# Patient Record
Sex: MALE | Race: WHITE | NOT HISPANIC OR LATINO | Employment: FULL TIME | URBAN - METROPOLITAN AREA
[De-identification: names, ages, dates, MRNs, and addresses within clinical notes are randomized per-mention and may not be internally consistent; named-entity substitution may affect disease eponyms.]

---

## 2018-05-10 ENCOUNTER — OFFICE VISIT CONVERTED (OUTPATIENT)
Dept: UROLOGY | Facility: CLINIC | Age: 38
End: 2018-05-10
Attending: UROLOGY

## 2018-05-17 ENCOUNTER — PROCEDURE VISIT CONVERTED (OUTPATIENT)
Dept: UROLOGY | Facility: CLINIC | Age: 38
End: 2018-05-17
Attending: UROLOGY

## 2018-07-30 ENCOUNTER — OFFICE VISIT CONVERTED (OUTPATIENT)
Dept: UROLOGY | Facility: CLINIC | Age: 38
End: 2018-07-30
Attending: UROLOGY

## 2020-06-08 ENCOUNTER — HOSPITAL ENCOUNTER (OUTPATIENT)
Dept: GENERAL RADIOLOGY | Facility: HOSPITAL | Age: 40
Discharge: HOME | End: 2020-06-08
Attending: NURSE PRACTITIONER

## 2020-10-23 ENCOUNTER — OFFICE VISIT CONVERTED (OUTPATIENT)
Dept: NEUROSURGERY | Facility: CLINIC | Age: 40
End: 2020-10-23
Attending: PHYSICIAN ASSISTANT

## 2020-11-16 ENCOUNTER — OUTSIDE FACILITY SERVICE (OUTPATIENT)
Dept: SLEEP MEDICINE | Facility: HOSPITAL | Age: 40
End: 2020-11-16

## 2020-11-16 ENCOUNTER — HOSPITAL ENCOUNTER (OUTPATIENT)
Dept: SLEEP MEDICINE | Facility: HOSPITAL | Age: 40
Discharge: HOME | End: 2020-11-16
Attending: INTERNAL MEDICINE

## 2020-11-16 PROCEDURE — 99244 OFF/OP CNSLTJ NEW/EST MOD 40: CPT | Performed by: INTERNAL MEDICINE

## 2020-12-01 ENCOUNTER — HOSPITAL ENCOUNTER (OUTPATIENT)
Dept: SLEEP MEDICINE | Facility: HOSPITAL | Age: 40
Discharge: HOME | End: 2020-12-01
Attending: INTERNAL MEDICINE

## 2020-12-02 ENCOUNTER — OUTSIDE FACILITY SERVICE (OUTPATIENT)
Dept: SLEEP MEDICINE | Facility: HOSPITAL | Age: 40
End: 2020-12-02

## 2020-12-02 PROCEDURE — 95806 SLEEP STUDY UNATT&RESP EFFT: CPT | Performed by: INTERNAL MEDICINE

## 2021-03-08 ENCOUNTER — OUTSIDE FACILITY SERVICE (OUTPATIENT)
Dept: SLEEP MEDICINE | Facility: HOSPITAL | Age: 41
End: 2021-03-08

## 2021-03-08 ENCOUNTER — HOSPITAL ENCOUNTER (OUTPATIENT)
Dept: SLEEP MEDICINE | Facility: HOSPITAL | Age: 41
Discharge: HOME | End: 2021-03-08
Attending: INTERNAL MEDICINE

## 2021-03-08 PROCEDURE — 99213 OFFICE O/P EST LOW 20 MIN: CPT | Performed by: INTERNAL MEDICINE

## 2021-04-06 ENCOUNTER — HOSPITAL ENCOUNTER (OUTPATIENT)
Age: 41
Discharge: HOME | End: 2021-04-06
Payer: COMMERCIAL

## 2021-04-06 ENCOUNTER — HOSPITAL ENCOUNTER (OUTPATIENT)
Dept: GENERAL RADIOLOGY | Age: 41
Discharge: HOME | End: 2021-04-06
Payer: COMMERCIAL

## 2021-04-06 DIAGNOSIS — R52 PAIN: ICD-10-CM

## 2021-04-06 PROCEDURE — 72070 X-RAY EXAM THORAC SPINE 2VWS: CPT

## 2021-04-06 PROCEDURE — 73560 X-RAY EXAM OF KNEE 1 OR 2: CPT

## 2021-04-06 PROCEDURE — 72100 X-RAY EXAM L-S SPINE 2/3 VWS: CPT

## 2021-05-10 NOTE — H&P
History and Physical      Patient Name: Aidan Curiel   Patient ID: [id number]   Sex: Male   YOB: 1980    Primary Care Provider: [facility]   Referring Provider: Sangita Anaya MD    Visit Date: October 23, 2020    Provider: Evangelina Chiu PA-C   [place] [facility]   Location Address: [address] [place] [address]  [id number]   Location Phone: [phone]          Chief Complaint     Patient is being seen today for low back pain. Patient states his low back pain radiates toward his mid back.       History Of Present Illness  The patient is a 39 year old /White male, who presents on referral from Sangita Anaya MD, for a neurosurgical evaluation low back pain and radiates to mid thoracic region. Prolonged sitting/standing makes pain worse. Back pain for years at this pain. Active duty in .   He also reports left ankle pain.. He denies bowel or bladder dysfunction. The patient has no prior history of neck or back surgery.   RECENT INTERVENTIONS:  He has been previously treated with topical ointment. Physical therapy few years ago without relief.   INFORMATION REVIEWED:  The following information was reviewed: radiology reports and images. MRI of the lumbar spine and from June 2020 at [facility] revealed small disc bulge at L5/S1 and mild left facet arthropathy at L4/5. These were the most notable findings.       Past Medical History  *No Pertinent Past Medical History         Past Surgical History  Ankle surgery; Tonsillectomy; Vasectomy         Allergy List  NO KNOWN DRUG ALLERGIES       Allergies Reconciled  Family Medical History  *No Known Family History         Social History  Tobacco (Never)         Review of Systems  · Constitutional  o Denies  o : chills, excessive sweating, fatigue, fever, sycope/passing out, weight gain, weight loss  · Eyes  o Denies  o : changes in vision, blurry vision, double vision  · HENT  o Admits  o :  "loss of hearing, ringing in the ears  o Denies  o : ear aches, sore throat, nasal congestion, sinus pain, nose bleeds, seasonal allergies  · Cardiovascular  o Denies  o : blood clots, swollen legs, anemia, easy burising or bleeding, transfusions  · Respiratory  o Denies  o : shortness of breath, dry cough, productive cough, pneumonia, COPD  · Gastrointestinal  o Denies  o : difficulty swallowing, reflux  · Genitourinary  o Denies  o : incontinence  · Neurologic  o Admits  o : difficulty with sleep  o Denies  o : headache, seizure, stroke, tremor, loss of balance, falls, dizziness/vertigo, numbness/tingling/paresthesia , difficulty with coordination, difficulty with dexterity, weakness  · Musculoskeletal  o Admits  o : spasms, low back pain  o Denies  o : neck stiffness/pain, swollen lymph nodes, muscle aches, joint pain, weakness, sciatica, pain radiating in arm, pain radiating in leg  · Endocrine  o Denies  o : diabetes, thyroid disorder  · Psychiatric  o Denies  o : anxiety, depression  · All Others Negative      Vitals  Date Time BP Position Site L\R Cuff Size HR RR TEMP (F) WT  HT  BMI kg/m2 BSA m2 O2 Sat FR L/min FiO2        10/23/2020 10:24 AM        96.9 199lbs 5oz 5'  6\" 32.17 2.05             Physical Examination  · Constitutional  o Appearance  o : well-nourished, well developed, alert, in no acute distress  · Respiratory  o Respiratory Effort  o : breathing unlabored  · Cardiovascular  o Peripheral Vascular System  o :   § Extremities  § : no edema or cyanosis  · Musculoskeletal  o Spine  o :   § Inspection/Palpation  § : tenderness to palpation present   o Right Lower Extremity  o :   § Inspection/Palpation  § : no joint or limb tenderness to palpation, no edema present, no ecchymosis  § Joint Stability  § : joint stability within normal limits  § Range of Motion  § : range of motion normal, no joint crepitations present, no pain on motion, Matteo's test negative  o Left Lower Extremity  o : "   § Inspection/Palpation  § : no joint or limb tenderness to palpation, no edema present, no ecchymosis  § Joint Stability  § : joint stability within normal limits  § Range of Motion  § : range of motion normal, no joint crepitations present, no pain on motion, Matteo's test negative  · Skin and Subcutaneous Tissue  o Extremities  o :   § Right Lower Extremity  § : no lesions or areas of discoloration  § Left Lower Extremity  § : no lesions or areas of discoloration  o Back  o : no lesions or areas of discoloration  · Neurologic  o Mental Status Examination  o :   § Orientation  § : alert and oriented to time, person, place and events  o Motor Examination  o :   § RLE Strength  § : strength normal  § RLE Motor Function  § : tone normal, no atrophy, no abnormal movements noted  § LLE Strength  § : strength normal  § LLE Motor Function  § : tone normal, no atrophy, no abnormal movements noted  o Reflexes  o :   § RLE  § : knee and ankle reflexes 2/4, SLR negative  § LLE  § : knee and ankle reflexes 2/4, SLR negative  o Sensation  o :   § Light Touch  § : sensation intact to light touch in extremities  o Gait and Station  o :   § Gait Screening  § : normal gait, able to stand without difficulty  · Psychiatric  o Mood and Affect  o : mood normal, affect appropriate          Assessment  · Displacement of lumbar intervertebral disc     722.10/M51.26  · Facet arthropathy, lumbar     721.3/M46.96  · Lumbago/low back pain     724.3/M54.40      Plan  · Medications  o Medications have been Reconciled  o Transition of Care or Provider Policy  · Instructions  o Encouraged to follow-up with Primary Care Provider for preventative care.  o The ROS and the PFSH were reviewed at today's visit.  o Call or return to office if symptoms worsen or persist.  o Very small disc protrusion at L5/S1 with mild facet arthropathy at L4/5. Surgery not recommended. He may consider LESB or lumbar RFA. Not interested in pain management referral at  this time to discuss these treatment options. F/U as needed.   · Referrals  o ID: [id number] Date: 10/23/2020 Type: Inbound  Specialty: Neurosurgery            Electronically Signed by: Evangelina Chiu PA-C -Author on October 23, 2020 11:09:52 AM

## 2021-05-12 ENCOUNTER — OFFICE VISIT CONVERTED (OUTPATIENT)
Dept: NEUROLOGY | Facility: CLINIC | Age: 41
End: 2021-05-12
Attending: NURSE PRACTITIONER

## 2021-05-14 VITALS — WEIGHT: 199.31 LBS | TEMPERATURE: 96.9 F | HEIGHT: 66 IN | BODY MASS INDEX: 32.03 KG/M2

## 2021-05-16 VITALS — RESPIRATION RATE: 14 BRPM | WEIGHT: 172 LBS | HEIGHT: 67 IN | BODY MASS INDEX: 27 KG/M2

## 2021-06-05 NOTE — PROGRESS NOTES
"   Progress Note      Patient Name: Aidan Curiel   Patient ID: [id number]   Sex: Male   YOB: 1980    Primary Care Provider: [facility]   Referring Provider: Sangita Anaya MD    Visit Date: May 12, 2021    Provider: TERESSA Solano   Location: [facility]   Location Address: [address] [place] [address]  [id number]   Location Phone: [phone]          Chief Complaint  · Follow-up     Patient following up for chronic low back pain. No new imaging       History Of Present Illness  The patient is a 40 year old /White male who is in the office for followup appointment.      Pt has history of chronic of low back pain, last MRI Lumbar spine a year ago showing mild degenerative changes at L4/5 and L5/S1 with facet arthropathy. Has c/o muscle tightness and spasms throughout the low back and into the buttocks. Worse with sitting for long periods of times. Doing stretches at home with no relief.       Past Medical History  *No Pertinent Past Medical History; Low back pain; Muscle cramps; Shoulder pain         Past Surgical History  Ankle surgery; EYE SURGERY; shoulder repair; Tonsillectomy; Vasectomy         Medication List  hydrocodone-acetaminophen 7.5-325 mg oral tablet; ibuprofen 800 mg oral tablet         Allergy List  NO KNOWN DRUG ALLERGIES       Allergies Reconciled  Family Medical History  *No Known Family History         Social History  Tobacco (Never)         Review of Systems  · Neurologic  o Admits  o : difficulty with sleep, weakness  · Musculoskeletal  o Admits  o : joint pain, spasms, pain radiating to arm, pain radiating to leg, neck stiffness/pain, muscle aches, weakness, low back pain      Vitals  Date Time BP Position Site L\R Cuff Size HR RR TEMP (F) WT  HT  BMI kg/m2 BSA m2 O2 Sat FR L/min FiO2 HC       05/12/2021 09:27 /97 Sitting    80 - R 105 97.1 189lbs 5oz 5'  6\" 30.56 2 100 %            Physical " Examination  · Constitutional  o Appearance  o : well-nourished, well developed, alert, in no acute distress  · Respiratory  o Respiratory Effort  o : breathing unlabored  · Cardiovascular  o Peripheral Vascular System  o :   § Extremities  § : no cyanosis, clubbing or edema  · Neurologic  o Mental Status Examination  o :   § Orientation  § : grossly oriented to person, place and time  o Sensation  o :   § Light Touch  § : sensation intact to light touch in extremities  o Gait and Station  o :   § Gait Screening  § : gait within normal limits  · Psychiatric  o Mood and Affect  o : mood normal, affect appropriate              Assessment  · Low back pain     724.2/M54.5  · Facet arthropathy, lumbar     721.3/M47.816  · Muscle spasm     728.85/M62.838       Pt with chronic low back pain, recently worsened with increased muscle spasm. Will refer to pain management for facet joint injections. Start Tizanidine 2-4 mg as needed for spams. Diclofenac 50 mg BID for pain. Follow up in 4 weeks.       Plan  · Orders  o PAIN MANAGEMENT CONSULTATION (PAINM) - 724.2/M54.5, 721.3/M47.816 - 2021   Refer to [facility] for lumbar facet joint injections.  · Medications  o diclofenac sodium 50 mg oral tablet,delayed release (DR/EC)   SIG: take 1 tablet PO prn BID - take with food   DISP: (60) Tablet with 1 refills  Prescribed on 2021     o tizanidine 2 mg oral tablet   SI-2 tablets every 8 hours as needed   DISP: (60) Tablet with 0 refills  Prescribed on 2021     o Medications have been Reconciled  o Transition of Care or Provider Policy  · Instructions  o Encouraged to follow-up with Primary Care Provider for preventative care.  o The ROS and the PFSH were reviewed at today's visit.  o Refer to pain management for Facet joint injections.  o Follow up 4 weeks.            Electronically Signed by: TERESSA Solano -Author on May 12, 2021 09:49:28 AM

## 2021-06-09 ENCOUNTER — OFFICE VISIT (OUTPATIENT)
Dept: NEUROSURGERY | Facility: CLINIC | Age: 41
End: 2021-06-09

## 2021-06-09 VITALS
HEIGHT: 66 IN | WEIGHT: 191.5 LBS | DIASTOLIC BLOOD PRESSURE: 96 MMHG | TEMPERATURE: 97.5 F | BODY MASS INDEX: 30.78 KG/M2 | HEART RATE: 67 BPM | SYSTOLIC BLOOD PRESSURE: 130 MMHG

## 2021-06-09 DIAGNOSIS — M47.816 LUMBAR FACET ARTHROPATHY: Primary | ICD-10-CM

## 2021-06-09 DIAGNOSIS — G89.29 CHRONIC MIDLINE LOW BACK PAIN WITHOUT SCIATICA: ICD-10-CM

## 2021-06-09 DIAGNOSIS — M54.50 CHRONIC MIDLINE LOW BACK PAIN WITHOUT SCIATICA: ICD-10-CM

## 2021-06-09 DIAGNOSIS — M62.838 MUSCLE SPASM: ICD-10-CM

## 2021-06-09 PROCEDURE — 99214 OFFICE O/P EST MOD 30 MIN: CPT | Performed by: NURSE PRACTITIONER

## 2021-06-09 RX ORDER — TIZANIDINE 2 MG/1
4 TABLET ORAL NIGHTLY
Qty: 60 TABLET | Refills: 2 | Status: SHIPPED | OUTPATIENT
Start: 2021-06-09 | End: 2021-09-29

## 2021-06-09 RX ORDER — IBUPROFEN 800 MG/1
800 TABLET ORAL 3 TIMES DAILY
COMMUNITY
End: 2021-06-09

## 2021-06-09 RX ORDER — MELOXICAM 15 MG/1
15 TABLET ORAL DAILY
COMMUNITY
End: 2021-06-09

## 2021-06-09 RX ORDER — TIZANIDINE 2 MG/1
2 TABLET ORAL DAILY
COMMUNITY
End: 2021-06-09 | Stop reason: SDUPTHER

## 2021-06-09 RX ORDER — OLOPATADINE HYDROCHLORIDE 1 MG/ML
800 SOLUTION/ DROPS OPHTHALMIC 2 TIMES DAILY
COMMUNITY
End: 2022-06-09

## 2021-06-09 RX ORDER — METAXALONE 800 MG/1
800 TABLET ORAL DAILY
COMMUNITY
End: 2021-06-09

## 2021-06-09 RX ORDER — HYDROCODONE BITARTRATE AND ACETAMINOPHEN 7.5; 325 MG/1; MG/1
325 TABLET ORAL 3 TIMES DAILY PRN
COMMUNITY
End: 2021-09-29

## 2021-06-09 NOTE — PATIENT INSTRUCTIONS
-Tizanidine 4 mg at bedtime  -Diclofenac  mg at bedtime  -Pain management for median nerve blocks  -Follow up in 8 weeks

## 2021-06-09 NOTE — PROGRESS NOTES
"Chief Complaint  Back Pain and Follow-up (referred to Pain Management- scheduled for RFA)    Subjective          Aidan Curiel who is a 40 y.o. year old male who presents to [facility] his low back pain    Pt saw pain management. He is scheduled for right median nerve blocks in a month. His pain symptoms are about the same. He is taking the muscle relaxant and diclofenac. Provides modest benefit.      Interval History   Pt has history of chronic of low back pain, last MRI Lumbar spine a year ago showing mild degenerative changes at L4/5 and L5/S1 with facet arthropathy. Has c/o muscle tightness and spasms throughout the low back and into the buttocks. Worse with sitting for long periods of times. Doing stretches at home with no relief.    Recent Interventions:       Review of Systems   Musculoskeletal: Positive for back pain and myalgias.   Neurological: Positive for weakness and numbness.   All other systems reviewed and are negative.       Objective   Vital Signs:   /96   Pulse 67   Temp 97.5 °F (36.4 °C)   Ht 167.6 cm (66\")   Wt 86.9 kg (191 lb 8 oz)   BMI 30.91 kg/m²       Physical Exam  Vitals reviewed.   Constitutional:       Appearance: Normal appearance.   Neurological:      Mental Status: He is alert and oriented to person, place, and time.      Gait: Gait is intact.      Deep Tendon Reflexes: Strength normal.        Neurologic Exam     Mental Status   Oriented to person, place, and time.   Level of consciousness: alert    Motor Exam   Muscle bulk: normal  Right leg tone: normal  Left leg tone: normal    Strength   Strength 5/5 throughout.     Sensory Exam   Light touch normal.     Gait, Coordination, and Reflexes     Gait  Gait: normal       Result Review :                 Assessment and Plan    Diagnoses and all orders for this visit:    1. Lumbar facet arthropathy (Primary)  -     diclofenac sodium (VOTAREN XR) 100 MG 24 hr tablet; Take 1 tablet by " mouth Daily.  Dispense: 30 tablet; Refill: 2    2. Chronic midline low back pain without sciatica  -     diclofenac sodium (VOTAREN XR) 100 MG 24 hr tablet; Take 1 tablet by mouth Daily.  Dispense: 30 tablet; Refill: 2    3. Muscle spasm  -     tiZANidine (ZANAFLEX) 2 MG tablet; Take 2 tablets by mouth Every Night.  Dispense: 60 tablet; Refill: 2        Follow Up   Return in about 8 weeks (around 8/4/2021).  Patient was given instructions and counseling regarding his condition or for health maintenance advice.     -Tizanidine 4 mg at bedtime  -Diclofenac  mg at bedtime  -Pain management for median nerve blocks  -Follow up in 8 weeks

## 2021-06-11 ENCOUNTER — HOSPITAL ENCOUNTER (EMERGENCY)
Facility: HOSPITAL | Age: 41
Discharge: HOME | End: 2021-06-11
Admitting: EMERGENCY MEDICINE

## 2021-06-11 VITALS
SYSTOLIC BLOOD PRESSURE: 124 MMHG | OXYGEN SATURATION: 96 % | WEIGHT: 192.46 LBS | TEMPERATURE: 98.4 F | DIASTOLIC BLOOD PRESSURE: 87 MMHG | BODY MASS INDEX: 30.93 KG/M2 | HEART RATE: 83 BPM | RESPIRATION RATE: 18 BRPM | HEIGHT: 66 IN

## 2021-06-11 DIAGNOSIS — Z48.02 VISIT FOR SUTURE REMOVAL: Primary | ICD-10-CM

## 2021-06-11 PROCEDURE — 99202 OFFICE O/P NEW SF 15 MIN: CPT

## 2021-06-11 PROCEDURE — 99282 EMERGENCY DEPT VISIT SF MDM: CPT

## 2021-06-11 NOTE — ED NOTES
Pt denied discharge instructions at this time.     Radha Hooper LPN  06/11/21 170     Diverticulitis    Pulmonary embolism

## 2021-06-11 NOTE — ED PROVIDER NOTES
Subjective     Suture / Staple Removal  Location:  Remove sutures from left hand  Quality:  Mild  Severity:  Mild  Onset quality:  Gradual  Timing:  Constant  Progression:  Partially resolved  Chronicity:  New  Context:  Sutures completed in the ER, needs removal.   Relieved by:  Nothing  Worsened by:  Nothing  Ineffective treatments:  Nothing  Associated symptoms: no abdominal pain, no chest pain, no congestion, no cough, no diarrhea, no ear pain, no fever, no headaches, no nausea, no rash, no shortness of breath, no sore throat and no vomiting        Review of Systems   Constitutional: Negative for chills and fever.   HENT: Negative for congestion, ear pain and sore throat.    Eyes: Negative for pain.   Respiratory: Negative for cough, chest tightness and shortness of breath.    Cardiovascular: Negative for chest pain.   Gastrointestinal: Negative for abdominal pain, diarrhea, nausea and vomiting.   Genitourinary: Negative for flank pain and hematuria.   Musculoskeletal: Negative for joint swelling.   Skin: Negative for pallor and rash.   Neurological: Negative for seizures and headaches.   All other systems reviewed and are negative.      Past Medical History:   Diagnosis Date   • Injury of back    • Low back pain        No Known Allergies    Past Surgical History:   Procedure Laterality Date   • ANKLE SURGERY Left    • LASIK     • SHOULDER ARTHROSCOPY W/ SUPERIOR LABRAL ANTERIOR POSTERIOR LESION REPAIR Left        History reviewed. No pertinent family history.    Social History     Socioeconomic History   • Marital status:      Spouse name: Not on file   • Number of children: Not on file   • Years of education: Not on file   • Highest education level: Not on file   Tobacco Use   • Smoking status: Never Smoker   • Smokeless tobacco: Never Used   Substance and Sexual Activity   • Alcohol use: Yes   • Drug use: Never           Objective   Physical Exam  Vitals and nursing note reviewed.   Constitutional:        General: He is not in acute distress.     Appearance: Normal appearance. He is not toxic-appearing.   HENT:      Head: Normocephalic and atraumatic.      Mouth/Throat:      Mouth: Mucous membranes are moist.   Eyes:      Extraocular Movements: Extraocular movements intact.      Pupils: Pupils are equal, round, and reactive to light.   Cardiovascular:      Rate and Rhythm: Normal rate and regular rhythm.      Pulses: Normal pulses.      Heart sounds: Normal heart sounds.   Pulmonary:      Effort: Pulmonary effort is normal. No respiratory distress.      Breath sounds: Normal breath sounds.   Abdominal:      General: Abdomen is flat.      Palpations: Abdomen is soft.      Tenderness: There is no abdominal tenderness.   Musculoskeletal:         General: Normal range of motion.      Cervical back: Normal range of motion and neck supple.   Skin:     General: Skin is warm and dry.      Capillary Refill: Capillary refill takes less than 2 seconds.          Neurological:      Mental Status: He is alert and oriented to person, place, and time. Mental status is at baseline.         Procedures           ED Course                                           MDM    Final diagnoses:   Visit for suture removal       ED Disposition  ED Disposition     ED Disposition Condition Comment    Discharge Stable           Marguerite Alanis  [address] [place] [address]  [phone]      As needed         Medication List      No changes were made to your prescriptions during this visit.          Wendi Ledesma APRN  06/11/21 4150

## 2021-07-15 VITALS
DIASTOLIC BLOOD PRESSURE: 97 MMHG | WEIGHT: 189.31 LBS | TEMPERATURE: 97.1 F | SYSTOLIC BLOOD PRESSURE: 147 MMHG | HEIGHT: 66 IN | BODY MASS INDEX: 30.42 KG/M2 | HEART RATE: 80 BPM | OXYGEN SATURATION: 100 %

## 2021-08-04 ENCOUNTER — OFFICE VISIT (OUTPATIENT)
Dept: NEUROSURGERY | Facility: CLINIC | Age: 41
End: 2021-08-04

## 2021-08-04 VITALS
OXYGEN SATURATION: 98 % | BODY MASS INDEX: 31.21 KG/M2 | SYSTOLIC BLOOD PRESSURE: 132 MMHG | HEIGHT: 66 IN | DIASTOLIC BLOOD PRESSURE: 90 MMHG | WEIGHT: 194.2 LBS | HEART RATE: 69 BPM

## 2021-08-04 DIAGNOSIS — G89.29 CHRONIC MIDLINE LOW BACK PAIN WITHOUT SCIATICA: Primary | ICD-10-CM

## 2021-08-04 DIAGNOSIS — M54.50 CHRONIC MIDLINE LOW BACK PAIN WITHOUT SCIATICA: Primary | ICD-10-CM

## 2021-08-04 DIAGNOSIS — M47.816 LUMBAR FACET ARTHROPATHY: ICD-10-CM

## 2021-08-04 PROCEDURE — 99213 OFFICE O/P EST LOW 20 MIN: CPT | Performed by: NURSE PRACTITIONER

## 2021-08-04 NOTE — PATIENT INSTRUCTIONS
-Stop tizanidine  -Continue Diclofenac  -Pain management appointment for median nerve blocks  -Follow up in 8 weeks

## 2021-08-04 NOTE — PROGRESS NOTES
"Chief Complaint  Follow-up (seeing pain management tomorrow)    Subjective          Aidan Curiel who is a 40 y.o. year old male who presents to [facility] for follow up his low back pain.    Pt had to cancel his appointment with pain management. He is scheduled tomorrow for first trial of median nerve blocks. Back pain is about the same. Rates his pain 7.5/10 today. Diclofenac and tizanidine are not providing much benefit. He is concerned when waking up in the morning his legs feel like \"rubber noodles.\" This improves with time. This just started over the past three weeks. He denies numbness or tingling into the legs.       Interval History 6/9/21  Aidan Curiel who is a 40 y.o. year old male who presents to [facility] his low back pain     Pt saw pain management. He is scheduled for right median nerve blocks in a month. His pain symptoms are about the same. He is taking the muscle relaxant and diclofenac. Provides modest benefit.        Interval History   Pt has history of chronic of low back pain, last MRI Lumbar spine a year ago showing mild degenerative changes at L4/5 and L5/S1 with facet arthropathy. Has c/o muscle tightness and spasms throughout the low back and into the buttocks. Worse with sitting for long periods of times. Doing stretches at home with no relief.     Recent Interventions: Scheduled for median nerve blocks.      Review of Systems   Musculoskeletal: Positive for arthralgias, back pain and gait problem.   Neurological: Positive for weakness.   All other systems reviewed and are negative.       Objective   Vital Signs:   /90   Pulse 69   Ht 167.6 cm (66\")   Wt 88.1 kg (194 lb 3.2 oz)   SpO2 98%   BMI 31.34 kg/m²       Physical Exam  Vitals reviewed.   Constitutional:       Appearance: Normal appearance.   Neurological:      Mental Status: He is alert and oriented to person, place, and time.      " Gait: Gait is intact.        Neurologic Exam     Mental Status   Oriented to person, place, and time.   Level of consciousness: alert    Gait, Coordination, and Reflexes     Gait  Gait: normal       Result Review :                 Assessment and Plan    Diagnoses and all orders for this visit:    1. Chronic midline low back pain without sciatica (Primary)    2. Lumbar facet arthropathy    Pt scheduled for median nerve blocks. Tizanidine and Diclofenac are not helping his pain. He is concerned regarding subjective weakness in the legs. This would not be well explained from his MRI. We discussed stopping his tizanidine and evaluate for improvement. Can consider stopping diclofenac as well. He will follow up in 8 weeks.     I spent 25 minutes caring for Aidan on this date of service. This time includes time spent by me in the following activities:preparing for the visit, reviewing tests, performing a medically appropriate examination and/or evaluation , counseling and educating the patient/family/caregiver, documenting information in the medical record and independently interpreting results and communicating that information with the patient/family/caregiver.    Follow Up   Return in about 8 weeks (around 9/29/2021).  Patient was given instructions and counseling regarding his condition or for health maintenance advice.     -Stop tizanidine  -Continue Diclofenac  -Pain management appointment for median nerve blocks  -Follow up in 8 weeks

## 2021-09-29 ENCOUNTER — OFFICE VISIT (OUTPATIENT)
Dept: NEUROSURGERY | Facility: CLINIC | Age: 41
End: 2021-09-29

## 2021-09-29 VITALS
HEIGHT: 66 IN | DIASTOLIC BLOOD PRESSURE: 90 MMHG | HEART RATE: 70 BPM | WEIGHT: 199 LBS | BODY MASS INDEX: 31.98 KG/M2 | SYSTOLIC BLOOD PRESSURE: 130 MMHG

## 2021-09-29 DIAGNOSIS — M47.816 LUMBAR FACET ARTHROPATHY: ICD-10-CM

## 2021-09-29 DIAGNOSIS — G89.29 CHRONIC MIDLINE LOW BACK PAIN WITHOUT SCIATICA: Primary | ICD-10-CM

## 2021-09-29 DIAGNOSIS — M54.50 CHRONIC MIDLINE LOW BACK PAIN WITHOUT SCIATICA: Primary | ICD-10-CM

## 2021-09-29 PROCEDURE — 99213 OFFICE O/P EST LOW 20 MIN: CPT | Performed by: NURSE PRACTITIONER

## 2021-09-29 NOTE — PROGRESS NOTES
"Chief Complaint  Back Pain    Subjective          Aidan Curiel who is a 40 y.o. year old male who presents to [facility] for follow up of his low back pain.     Pt saw pain management and received the first trial of median nerve blocks. This provided no improvement in his pain. He is not scheduled for further treatment. He reports that pain management has ordered another MRI Lumbar Spine, which has not been scheduled yet.    He has stopped the diclofenac and tizanidine. No longer having the abnormal feelings in his legs.     His back pain is the same, severe across the low back pain. He has tried lidocaine creams without relief.       Interval History   Aidan Curiel who is a 40 y.o. year old male who presents to [facility] for follow up his low back pain.     Pt had to cancel his appointment with pain management. He is scheduled tomorrow for first trial of median nerve blocks. Back pain is about the same. Rates his pain 7.5/10 today. Diclofenac and tizanidine are not providing much benefit. He is concerned when waking up in the morning his legs feel like \"rubber noodles.\" This improves with time. This just started over the past three weeks. He denies numbness or tingling into the legs.         Interval History 6/9/21  Aidan Curiel who is a 40 y.o. year old male who presents to [facility] his low back pain     Pt saw pain management. He is scheduled for right median nerve blocks in a month. His pain symptoms are about the same. He is taking the muscle relaxant and diclofenac. Provides modest benefit.        Interval History   Pt has history of chronic of low back pain, last MRI Lumbar spine a year ago showing mild degenerative changes at L4/5 and L5/S1 with facet arthropathy. Has c/o muscle tightness and spasms throughout the low back and into the buttocks. Worse with sitting for long " "periods of times. Doing stretches at home with no relief.    Recent Interventions: Diclofenac, tizanidine, PT, MBB (ineffective)      Review of Systems   Musculoskeletal: Positive for arthralgias and back pain.   All other systems reviewed and are negative.       Objective   Vital Signs:   /90   Pulse 70   Ht 167.6 cm (66\")   Wt 90.3 kg (199 lb)   BMI 32.12 kg/m²       Physical Exam  Vitals reviewed.   Constitutional:       Appearance: Normal appearance.   Neurological:      Mental Status: He is alert and oriented to person, place, and time.      Gait: Gait is intact.        Neurologic Exam     Mental Status   Oriented to person, place, and time.   Level of consciousness: alert    Gait, Coordination, and Reflexes     Gait  Gait: normal       Result Review :                 Assessment and Plan    Diagnoses and all orders for this visit:    1. Chronic midline low back pain without sciatica (Primary)  -     Ambulatory Referral to Physical Therapy Evaluate and treat; Heat; Moist heat; Cross Fiber    2. Lumbar facet arthropathy  -     Ambulatory Referral to Physical Therapy Evaluate and treat; Heat; Moist heat; Cross Fiber    Pt with chronic low back pain with muscle spasms. His pain is primarily musculoskeletal in nature. No surgical recommendations at this time. Will refer to physical therapy for dry needling. He reports having another MRI Lumbar Spine ordered. We can review this at his next follow up in 8 weeks.       Follow Up   Return in about 8 weeks (around 11/24/2021).  Patient was given instructions and counseling regarding his condition or for health maintenance advice.     -Physical therapy for dry needling  -Follow up in 8 weeks  "

## 2021-11-23 ENCOUNTER — OFFICE VISIT (OUTPATIENT)
Dept: NEUROSURGERY | Facility: CLINIC | Age: 41
End: 2021-11-23

## 2021-11-23 VITALS — BODY MASS INDEX: 30.53 KG/M2 | WEIGHT: 190 LBS | HEART RATE: 88 BPM | HEIGHT: 66 IN

## 2021-11-23 DIAGNOSIS — M51.26 LUMBAR DISC HERNIATION: Primary | ICD-10-CM

## 2021-11-23 DIAGNOSIS — M54.42 CHRONIC MIDLINE LOW BACK PAIN WITH BILATERAL SCIATICA: ICD-10-CM

## 2021-11-23 DIAGNOSIS — G89.29 CHRONIC MIDLINE LOW BACK PAIN WITH BILATERAL SCIATICA: ICD-10-CM

## 2021-11-23 DIAGNOSIS — M54.41 CHRONIC MIDLINE LOW BACK PAIN WITH BILATERAL SCIATICA: ICD-10-CM

## 2021-11-23 PROCEDURE — 99213 OFFICE O/P EST LOW 20 MIN: CPT | Performed by: NURSE PRACTITIONER

## 2021-11-23 RX ORDER — DULOXETIN HYDROCHLORIDE 30 MG/1
CAPSULE, DELAYED RELEASE ORAL
COMMUNITY
Start: 2021-10-28 | End: 2022-06-09

## 2021-11-23 RX ORDER — AMITRIPTYLINE HYDROCHLORIDE 25 MG/1
25 TABLET, FILM COATED ORAL NIGHTLY
COMMUNITY
Start: 2021-10-13 | End: 2022-06-09

## 2021-11-23 NOTE — PROGRESS NOTES
"Chief Complaint  Back Pain    Subjective          Aidan Curiel who is a 41 y.o. year old male who presents to [facility] NEUROLOGY & NEUROSURGERY for follow up of his low back pain. Recent MRI Lumbar Spine at [facility].    At his last visit he was referred to physical therapy for dry needling. He attended one visit, though insurance denied continuation of dry needling as he needed the order to go through his PCP. He recently had another MRI Lumbar Spine at [facility], revealing a central disc herniation at L5/S1 effacing the thecal sac without canal or foraminal stenosis. Pain is primarily in the low back radiating into the buttocks. He has numbness and tingling in the soles of his feet with muscle cramping in the posterior calves, worse at night.       Interval History 9/29/21     Aidan Curiel who is a 40 y.o. year old male who presents to [facility] NEUROLOGY & NEUROSURGERY for follow up of his low back pain.      Pt saw pain management and received the first trial of median nerve blocks. This provided no improvement in his pain. He is not scheduled for further treatment. He reports that pain management has ordered another MRI Lumbar Spine, which has not been scheduled yet.     He has stopped the diclofenac and tizanidine. No longer having the abnormal feelings in his legs.      His back pain is the same, severe across the low back pain. He has tried lidocaine creams without relief.     Recent Interventions: PT, Lumbar MBB (ineffective)      Review of Systems   Musculoskeletal: Positive for arthralgias and back pain.   Neurological: Positive for numbness.   All other systems reviewed and are negative.       Objective   Vital Signs:   Pulse 88   Ht 167.6 cm (66\")   Wt 86.2 kg (190 lb)   BMI 30.67 kg/m²       Physical Exam  Vitals reviewed.   Constitutional:       Appearance: Normal appearance.   Neurological:      Mental Status: He is alert and oriented to " person, place, and time.      Gait: Gait is intact.      Deep Tendon Reflexes: Strength normal.        Neurologic Exam     Mental Status   Oriented to person, place, and time.   Level of consciousness: alert    Motor Exam   Muscle bulk: normal  Overall muscle tone: normal    Strength   Strength 5/5 throughout.     Gait, Coordination, and Reflexes     Gait  Gait: normal       Result Review :               MRI Lumbar Spine at [facility] personally reviewed. Central disc protrusion at L5/S1 without canal or foraminal stenosis.    Assessment and Plan    Diagnoses and all orders for this visit:    1. Lumbar disc herniation (Primary)  -     Ambulatory Referral to Pain Management    2. Chronic midline low back pain with bilateral sciatica    Pt with chronic low back pain with sciatica. Recent repeated MRI Lumbar Spine showing a new disc herniation at L5/S1. There is no significant canal or foraminal narrowing. No surgical recommendations at this time. Will refer to pain management for LESB L5/S1. He will follow up in 8 weeks to evaluate response to epidural injections.       Follow Up   Return in about 8 weeks (around 1/18/2022).  Patient was given instructions and counseling regarding his condition or for health maintenance advice.     -Referral to pain management for LESB L5/S1  -Follow up in 8 weeks

## 2022-01-18 ENCOUNTER — OFFICE VISIT (OUTPATIENT)
Dept: NEUROSURGERY | Facility: CLINIC | Age: 42
End: 2022-01-18

## 2022-01-18 VITALS — HEIGHT: 66 IN | BODY MASS INDEX: 28.61 KG/M2 | WEIGHT: 178 LBS

## 2022-01-18 DIAGNOSIS — M54.41 CHRONIC MIDLINE LOW BACK PAIN WITH BILATERAL SCIATICA: Primary | ICD-10-CM

## 2022-01-18 DIAGNOSIS — M54.42 CHRONIC MIDLINE LOW BACK PAIN WITH BILATERAL SCIATICA: Primary | ICD-10-CM

## 2022-01-18 DIAGNOSIS — M51.26 LUMBAR DISC HERNIATION: ICD-10-CM

## 2022-01-18 DIAGNOSIS — G89.29 CHRONIC MIDLINE LOW BACK PAIN WITH BILATERAL SCIATICA: Primary | ICD-10-CM

## 2022-01-18 DIAGNOSIS — M47.816 LUMBAR FACET ARTHROPATHY: ICD-10-CM

## 2022-01-18 PROCEDURE — 99212 OFFICE O/P EST SF 10 MIN: CPT | Performed by: NURSE PRACTITIONER

## 2022-01-18 RX ORDER — ZOLPIDEM TARTRATE 10 MG/1
10 TABLET ORAL NIGHTLY PRN
COMMUNITY
Start: 2021-12-29

## 2022-01-18 RX ORDER — DULOXETIN HYDROCHLORIDE 60 MG/1
60 CAPSULE, DELAYED RELEASE ORAL DAILY
COMMUNITY
Start: 2021-11-30

## 2022-01-18 NOTE — PROGRESS NOTES
"Chief Complaint  Back Pain    Subjective          Aidan Curiel who is a 41 y.o. year old male who presents to [facility] NEUROLOGY & NEUROSURGERY for follow up of low back pain.     At his last visit he had a recent MRI Lumbar Spine showing a central disc herniation at L5/S1. He was referred to pain management for LESB. He reports that he has been playing phone tag with the pain management office and has not been back to see them since our last visit. His pain today is primarily in the low back and hips bilaterally radiating to buttocks. Denies any pain into the legs.     He recently started Cymbalta for musculoskeletal pain.       Interval History 11/23/21     Aidan Curiel who is a 41 y.o. year old male who presents to [facility] NEUROLOGY & NEUROSURGERY for follow up of his low back pain. Recent MRI Lumbar Spine at [facility].     At his last visit he was referred to physical therapy for dry needling. He attended one visit, though insurance denied continuation of dry needling as he needed the order to go through his PCP. He recently had another MRI Lumbar Spine at [facility], revealing a central disc herniation at L5/S1 effacing the thecal sac without canal or foraminal stenosis. Pain is primarily in the low back radiating into the buttocks. He has numbness and tingling in the soles of his feet with muscle cramping in the posterior calves, worse at night.     Recent Interventions: Referred to pain management for LESB. Cymbalta.      Review of Systems   Musculoskeletal: Positive for arthralgias and back pain.   All other systems reviewed and are negative.       Objective   Vital Signs:   Ht 167.6 cm (66\")   Wt 80.7 kg (178 lb)   BMI 28.73 kg/m²       Physical Exam  Vitals reviewed.   Constitutional:       Appearance: Normal appearance.   Musculoskeletal:      Lumbar back: Tenderness present. Negative right straight leg raise test and negative left straight leg " raise test.      Right hip: Tenderness (SI joint TTP) present.      Left hip: Tenderness (SI joint TTP) present.   Neurological:      Mental Status: He is alert and oriented to person, place, and time.      Gait: Gait is intact.      Deep Tendon Reflexes: Strength normal.        Neurologic Exam     Mental Status   Oriented to person, place, and time.   Level of consciousness: alert    Motor Exam   Muscle bulk: normal  Overall muscle tone: normal    Strength   Strength 5/5 throughout.     Gait, Coordination, and Reflexes     Gait  Gait: normal       Result Review :                 Assessment and Plan    Diagnoses and all orders for this visit:    1. Chronic midline low back pain with bilateral sciatica (Primary)    2. Lumbar disc herniation    3. Lumbar facet arthropathy    Pt with chronic low back pain with acute disc herniation at L5/S1 without canal or foraminal narrowing. He has been referred for LESB with pain management. He will follow up in 8 weeks.       Follow Up   Return in about 8 weeks (around 3/15/2022).  Patient was given instructions and counseling regarding his condition or for health maintenance advice.     -Reach out to pain management  -Follow up in 8 weeks

## 2022-05-09 ENCOUNTER — TELEPHONE (OUTPATIENT)
Dept: GASTROENTEROLOGY | Facility: CLINIC | Age: 42
End: 2022-05-09

## 2022-06-08 ENCOUNTER — OFFICE VISIT (OUTPATIENT)
Dept: GASTROENTEROLOGY | Facility: CLINIC | Age: 42
End: 2022-06-08

## 2022-06-08 VITALS
HEART RATE: 80 BPM | HEIGHT: 66 IN | BODY MASS INDEX: 27.93 KG/M2 | SYSTOLIC BLOOD PRESSURE: 140 MMHG | DIASTOLIC BLOOD PRESSURE: 82 MMHG | WEIGHT: 173.8 LBS

## 2022-06-08 DIAGNOSIS — K62.5 RECTAL BLEEDING: Primary | ICD-10-CM

## 2022-06-08 PROCEDURE — 99204 OFFICE O/P NEW MOD 45 MIN: CPT | Performed by: NURSE PRACTITIONER

## 2022-06-08 RX ORDER — PRAMOXINE HYDROCHLORIDE HYDROCORTISONE ACETATE 100; 100 MG/10G; MG/10G
1 AEROSOL, FOAM TOPICAL 2 TIMES DAILY PRN
COMMUNITY
Start: 2022-04-13

## 2022-06-08 RX ORDER — POLYETHYLENE GLYCOL 3350, SODIUM SULFATE, SODIUM CHLORIDE, POTASSIUM CHLORIDE, ASCORBIC ACID, SODIUM ASCORBATE 140-9-5.2G
1 KIT ORAL TAKE AS DIRECTED
Qty: 1 EACH | Refills: 0 | Status: ON HOLD | OUTPATIENT
Start: 2022-06-08 | End: 2022-06-10

## 2022-06-08 NOTE — PROGRESS NOTES
Patient Name: Aidan Curiel   Visit Date: 06/08/2022   Patient ID: [id number]  Provider: TERESSA Rainey    Sex: male  Location:  Location Address:  Location Phone: [phone]  [place] [address] [phone]    YOB: 1980      Primary Care Provider Marguerite Alanis APRN      Referring Provider: No ref. provider found        Chief Complaint  Black or Bloody Stool and Constipation    History of Present Illness  Aidan Curiel is a 41 y.o. who presents to [facility] GASTROENTEROLOGY on referral from No ref. provider found for a gastroenterology evaluation of Black or Bloody Stool and Constipation     Mr. Curiel reports bright red rectal bleeding on toilet tissue on and off for several years now.  Frequency of blood is not consistent.  Bowel movement at least once daily. Describes stool to be anywhere from #1-7 on the bristol stool chart. Denies any abdominal pain or melena. No frequent NSAID usage. No prior colonoscopy.  Appetite and weight stable.    Labs Result Review Imaging    Past Medical History:   Diagnosis Date   • GERD (gastroesophageal reflux disease)     Comes and goes   • GI (gastrointestinal bleed)     Blood in stool that will be for a day or two then goes away and then will come back for a day or two   • Hypertension 2019    Not all the time   • Injury of back    • Low back pain        Past Surgical History:   Procedure Laterality Date   • ANKLE SURGERY Left    • LASIK     • SHOULDER ARTHROSCOPY W/ SUPERIOR LABRAL ANTERIOR POSTERIOR LESION REPAIR Left          Current Outpatient Medications:   •  amitriptyline (ELAVIL) 25 MG tablet, , Disp: , Rfl:   •  DULoxetine (CYMBALTA) 30 MG capsule, , Disp: , Rfl:   •  DULoxetine (CYMBALTA) 60 MG capsule, , Disp: , Rfl:   •  olopatadine (PATANOL) 0.1 % ophthalmic solution, Administer 800 drops to both eyes 2 (two) times a day., Disp: , Rfl:   •  PEG-KCl-NaCl-NaSulf-Na Asc-C (Plenvu) 140 g reconstituted solution  "solution, Take 140 g by mouth Take As Directed., Disp: 1 each, Rfl: 0  •  Proctofoam HC 1-1 % rectal foam, , Disp: , Rfl:   •  zolpidem (AMBIEN) 10 MG tablet, , Disp: , Rfl:      No Known Allergies    Family History   Problem Relation Age of Onset   • Hemochromatosis Father         Social History     Social History Narrative   • Not on file         Objective     Review of Systems   Gastrointestinal: Positive for anal bleeding.        Vital Signs:   /82 (BP Location: Right arm, Patient Position: Sitting, Cuff Size: Small Adult)   Pulse 80   Ht 167.6 cm (66\")   Wt 78.8 kg (173 lb 12.8 oz)   BMI 28.05 kg/m²       Physical Exam  Constitutional:       General: He is not in acute distress.     Appearance: Normal appearance. He is well-developed and normal weight.   HENT:      Head: Normocephalic and atraumatic.   Eyes:      Conjunctiva/sclera: Conjunctivae normal.      Pupils: Pupils are equal, round, and reactive to light.      Visual Fields: Right eye visual fields normal and left eye visual fields normal.   Cardiovascular:      Rate and Rhythm: Normal rate and regular rhythm.      Heart sounds: Normal heart sounds.   Pulmonary:      Effort: Pulmonary effort is normal. No retractions.      Breath sounds: Normal breath sounds and air entry.   Abdominal:      General: Bowel sounds are normal. There is no distension.      Palpations: Abdomen is soft.      Tenderness: There is no abdominal tenderness.      Comments: No appreciable hepatosplenomegaly or ascites   Musculoskeletal:         General: Normal range of motion.      Cervical back: Normal range of motion and neck supple.   Skin:     General: Skin is warm and dry.   Neurological:      Mental Status: He is alert and oriented to person, place, and time.   Psychiatric:         Mood and Affect: Mood and affect normal.         Behavior: Behavior normal.         Result Review :   The following data was reviewed by: TERESSA Rainey on 06/08/2022:      No " results found for: LIPASE, AMYLASE, IRON, TIBC, LABIRON, TRANSFERRIN, FERRITIN, SEDRATE, CRP, AFPTM, DSDNA, EXPANDEDENA, SMOOTHMUSCAB, CERULOPLSM, LABIMMURE, TOTIGGREF, IGA, IGM, MITOAB, HEPBSAG, HEPAIGM, HEPBIGMCORE, HEPCVIRUSABY, PROTIME, INR, AMMONIA          Assessment and Plan    Diagnoses and all orders for this visit:    1. Rectal bleeding (Primary)  -     Case Request; Standing  -     Case Request    Other orders  -     Follow Anesthesia Guidelines / Protocol; Future  -     Obtain Informed Consent; Future  -     PEG-KCl-NaCl-NaSulf-Na Asc-C (Plenvu) 140 g reconstituted solution solution; Take 140 g by mouth Take As Directed.  Dispense: 1 each; Refill: 0      COLONOSCOPY (N/A)       Follow Up   Return for Follow up after procedure.  Patient was given instructions and counseling regarding his condition or for health maintenance advice. Please see specific information pulled into the AVS if appropriate.

## 2022-06-09 ENCOUNTER — TELEPHONE (OUTPATIENT)
Dept: GASTROENTEROLOGY | Facility: CLINIC | Age: 42
End: 2022-06-09

## 2022-06-09 RX ORDER — ACETAMINOPHEN 500 MG
1000 TABLET ORAL 3 TIMES DAILY PRN
COMMUNITY

## 2022-06-09 NOTE — TELEPHONE ENCOUNTER
Received phone call from Muriel with [facility], authorization request through  has been submitted still pending approval. Contacted patient to let them know to not start prep and to contact our office for reschedule

## 2022-06-09 NOTE — TELEPHONE ENCOUNTER
Patient returned phone call about surgery not approved by [facility]. Patient contacted insurance directly and they informed him that it would be approved however it wasn't submitted early enough. Patient is aware they would have to sign notice due to approval not on file. Called Andreea and patient was added back to schedule for 06/10

## 2022-06-09 NOTE — PRE-PROCEDURE INSTRUCTIONS
Pt. Instructed on laxative and skin prep, pre-op meds, clear liquid diet. Ok to take all meds a.m. of procedure.

## 2022-06-10 ENCOUNTER — ANESTHESIA EVENT (OUTPATIENT)
Dept: GASTROENTEROLOGY | Facility: HOSPITAL | Age: 42
End: 2022-06-10

## 2022-06-10 ENCOUNTER — HOSPITAL ENCOUNTER (OUTPATIENT)
Facility: HOSPITAL | Age: 42
Setting detail: HOSPITAL OUTPATIENT SURGERY
Discharge: HOME | End: 2022-06-10
Attending: INTERNAL MEDICINE | Admitting: INTERNAL MEDICINE

## 2022-06-10 ENCOUNTER — ANESTHESIA (OUTPATIENT)
Dept: GASTROENTEROLOGY | Facility: HOSPITAL | Age: 42
End: 2022-06-10

## 2022-06-10 VITALS
TEMPERATURE: 97.8 F | HEART RATE: 69 BPM | BODY MASS INDEX: 26.69 KG/M2 | WEIGHT: 165.34 LBS | RESPIRATION RATE: 18 BRPM | SYSTOLIC BLOOD PRESSURE: 121 MMHG | OXYGEN SATURATION: 100 % | DIASTOLIC BLOOD PRESSURE: 84 MMHG

## 2022-06-10 DIAGNOSIS — K62.5 RECTAL BLEEDING: ICD-10-CM

## 2022-06-10 PROCEDURE — 25010000002 PROPOFOL 10 MG/ML EMULSION: Performed by: NURSE ANESTHETIST, CERTIFIED REGISTERED

## 2022-06-10 PROCEDURE — 45385 COLONOSCOPY W/LESION REMOVAL: CPT | Performed by: INTERNAL MEDICINE

## 2022-06-10 PROCEDURE — 88305 TISSUE EXAM BY PATHOLOGIST: CPT | Performed by: INTERNAL MEDICINE

## 2022-06-10 RX ORDER — PROPOFOL 10 MG/ML
VIAL (ML) INTRAVENOUS AS NEEDED
Status: DISCONTINUED | OUTPATIENT
Start: 2022-06-10 | End: 2022-06-10 | Stop reason: SURG

## 2022-06-10 RX ORDER — SODIUM CHLORIDE, SODIUM LACTATE, POTASSIUM CHLORIDE, CALCIUM CHLORIDE 600; 310; 30; 20 MG/100ML; MG/100ML; MG/100ML; MG/100ML
30 INJECTION, SOLUTION INTRAVENOUS CONTINUOUS
Status: DISCONTINUED | OUTPATIENT
Start: 2022-06-10 | End: 2022-06-10 | Stop reason: HOSPADM

## 2022-06-10 RX ORDER — LIDOCAINE HYDROCHLORIDE 20 MG/ML
INJECTION, SOLUTION EPIDURAL; INFILTRATION; INTRACAUDAL; PERINEURAL AS NEEDED
Status: DISCONTINUED | OUTPATIENT
Start: 2022-06-10 | End: 2022-06-10 | Stop reason: SURG

## 2022-06-10 RX ORDER — SODIUM CHLORIDE, SODIUM LACTATE, POTASSIUM CHLORIDE, CALCIUM CHLORIDE 600; 310; 30; 20 MG/100ML; MG/100ML; MG/100ML; MG/100ML
1000 INJECTION, SOLUTION INTRAVENOUS CONTINUOUS
Status: DISCONTINUED | OUTPATIENT
Start: 2022-06-10 | End: 2022-06-10 | Stop reason: HOSPADM

## 2022-06-10 RX ADMIN — LIDOCAINE HYDROCHLORIDE 100 MG: 20 INJECTION, SOLUTION EPIDURAL; INFILTRATION; INTRACAUDAL; PERINEURAL at 07:21

## 2022-06-10 RX ADMIN — PROPOFOL 100 MG: 10 INJECTION, EMULSION INTRAVENOUS at 07:22

## 2022-06-10 RX ADMIN — SODIUM CHLORIDE, POTASSIUM CHLORIDE, SODIUM LACTATE AND CALCIUM CHLORIDE 1000 ML: 600; 310; 30; 20 INJECTION, SOLUTION INTRAVENOUS at 06:30

## 2022-06-10 RX ADMIN — SODIUM CHLORIDE, POTASSIUM CHLORIDE, SODIUM LACTATE AND CALCIUM CHLORIDE: 600; 310; 30; 20 INJECTION, SOLUTION INTRAVENOUS at 07:13

## 2022-06-10 RX ADMIN — PROPOFOL 150 MCG/KG/MIN: 10 INJECTION, EMULSION INTRAVENOUS at 07:22

## 2022-06-10 NOTE — ANESTHESIA PREPROCEDURE EVALUATION
Anesthesia Evaluation     Patient summary reviewed and Nursing notes reviewed   NPO Solid Status: > 6 hours  NPO Liquid Status: > 6 hours           Airway   Mallampati: II  TM distance: >3 FB  Dental      Pulmonary - normal exam   (+) sleep apnea,   Cardiovascular - normal exam  Exercise tolerance: good (4-7 METS)    (+) hypertension,       Neuro/Psych  GI/Hepatic/Renal/Endo    (+)  GERD,      Musculoskeletal     Abdominal    Substance History      OB/GYN          Other                        Anesthesia Plan    ASA 3     general and MAC     intravenous induction     Anesthetic plan, risks, benefits, and alternatives have been provided, discussed and informed consent has been obtained with: patient.        CODE STATUS:

## 2022-06-10 NOTE — H&P
Pre Procedure History & Physical    Chief Complaint:   Rectal bleeding    Subjective     HPI:   42 yo M here for eval of rectal bleeding.    Past Medical History:   Past Medical History:   Diagnosis Date   • GERD (gastroesophageal reflux disease)     Comes and goes   • GI (gastrointestinal bleed)     Blood in stool that will be for a day or two then goes away and then will come back for a day or two   • Hypertension 2019    Not all the time   • Injury of back    • Low back pain    • PONV (postoperative nausea and vomiting)    • Sleep apnea        Past Surgical History:  Past Surgical History:   Procedure Laterality Date   • ANKLE SURGERY Left    • LASIK     • SHOULDER ARTHROSCOPY W/ SUPERIOR LABRAL ANTERIOR POSTERIOR LESION REPAIR Left    • TONSILLECTOMY         Family History:  Family History   Problem Relation Age of Onset   • Hemochromatosis Father    • Malig Hyperthermia Neg Hx        Social History:   reports that he has never smoked. He has never used smokeless tobacco. He reports current alcohol use of about 2.0 standard drinks of alcohol per week. He reports that he does not use drugs.    Medications:   Medications Prior to Admission   Medication Sig Dispense Refill Last Dose   • acetaminophen (TYLENOL) 500 MG tablet Take 1,000 mg by mouth 3 (Three) Times a Day As Needed for Mild Pain .   6/9/2022 at Unknown time   • DULoxetine (CYMBALTA) 60 MG capsule Take 60 mg by mouth Daily.   6/10/2022 at Unknown time   • zolpidem (AMBIEN) 10 MG tablet Take 10 mg by mouth At Night As Needed.   6/10/2022 at Unknown time   • Proctofoam HC 1-1 % rectal foam Insert 1 application into the rectum 2 (Two) Times a Day As Needed.   Unknown at Unknown time       Allergies:  Patient has no known allergies.    ROS:    Pertinent items are noted in HPI     Objective     Blood pressure 146/90, pulse 75, temperature 97.1 °F (36.2 °C), temperature source Temporal, resp. rate 18, weight 75 kg (165 lb 5.5 oz), SpO2 97 %.    Physical Exam    Constitutional: Pt is oriented to person, place, and time and well-developed, well-nourished, and in no distress.   Mouth/Throat: Oropharynx is clear and moist.   Neck: Normal range of motion.   Cardiovascular: Normal rate, regular rhythm and normal heart sounds.    Pulmonary/Chest: Effort normal and breath sounds normal.   Abdominal: Soft. Nontender  Skin: Skin is warm and dry.   Psychiatric: Mood, memory, affect and judgment normal.     Assessment & Plan     Diagnosis:  Rectal bleeding    Anticipated Surgical Procedure:  Colonoscopy    The risks, benefits, and alternatives of this procedure have been discussed with the patient or the responsible party- the patient understands and agrees to proceed.

## 2022-06-10 NOTE — ANESTHESIA POSTPROCEDURE EVALUATION
Patient: Aidan Curiel    Procedure Summary     Date: 06/10/22 Room / Location: [facility] ENDOSCOPY 1 / [facility] ENDOSCOPY    Anesthesia Start: 0711 Anesthesia Stop: 0738    Procedure: COLONOSCOPY WITH POLYPECTOMY COLD SNARE (N/A ) Diagnosis:       Rectal bleeding      (Rectal bleeding [K62.5])    Surgeons: Deepti Joyner MD Provider: Mike Llamas MD    Anesthesia Type: general, MAC ASA Status: 3          Anesthesia Type: general, MAC    Vitals  Vitals Value Taken Time   /82 06/10/22 0743   Temp 36.4 °C (97.6 °F) 06/10/22 0743   Pulse 70 06/10/22 0743   Resp 17 06/10/22 0743   SpO2 98 % 06/10/22 0743           Post Anesthesia Care and Evaluation    Patient location during evaluation: bedside  Patient participation: complete - patient participated  Level of consciousness: awake  Pain score: 0  Pain management: adequate    Airway patency: patent  Anesthetic complications: No anesthetic complications  PONV Status: none  Cardiovascular status: acceptable and stable  Respiratory status: acceptable and room air  Hydration status: acceptable    Comments: An Anesthesiologist personally participated in the most demanding procedures (including induction and emergence if applicable) in the anesthesia plan, monitored the course of anesthesia administration at frequent intervals and remained physically present and available for immediate diagnosis and treatment of emergencies.

## 2022-06-13 LAB
CYTO UR: NORMAL
LAB AP CASE REPORT: NORMAL
LAB AP CLINICAL INFORMATION: NORMAL
PATH REPORT.FINAL DX SPEC: NORMAL
PATH REPORT.GROSS SPEC: NORMAL

## 2022-06-16 ENCOUNTER — TELEPHONE (OUTPATIENT)
Dept: GASTROENTEROLOGY | Facility: CLINIC | Age: 42
End: 2022-06-16

## 2022-06-16 DIAGNOSIS — K63.9 NODULE OF COLON: Primary | ICD-10-CM

## 2022-06-16 NOTE — TELEPHONE ENCOUNTER
Spoke to pt and informed of Edgar GANNON note. CT scheduled on 06/21/2022 in [place], [place]. F/U scheduled on 07/13/2022. Pt verified understanding.

## 2022-06-16 NOTE — TELEPHONE ENCOUNTER
----- Message from TERESSA Rainey sent at 6/16/2022 10:24 AM EDT -----  Please place patient in colon recall for 5 years given nature of colon polyps.  Ordering CT of the abdomen and pelvis with contrast to further evaluate the base of the cecum due to submucosal nodule found at appendiceal orifice.  Orders placed.  Please make sure this gets scheduled.  Please also make sure patient has obtained Rx for nifedipine cream for treatment of anal fissure.  Please schedule follow-up after CT has been performed.

## 2022-06-21 ENCOUNTER — HOSPITAL ENCOUNTER (OUTPATIENT)
Dept: CT IMAGING | Facility: HOSPITAL | Age: 42
Discharge: HOME | End: 2022-06-21
Admitting: NURSE PRACTITIONER

## 2022-06-21 DIAGNOSIS — K63.9 NODULE OF COLON: ICD-10-CM

## 2022-06-21 PROCEDURE — 74177 CT ABD & PELVIS W/CONTRAST: CPT

## 2022-06-21 PROCEDURE — 0 IOPAMIDOL PER 1 ML: Performed by: NURSE PRACTITIONER

## 2022-06-21 RX ADMIN — IOPAMIDOL 100 ML: 755 INJECTION, SOLUTION INTRAVENOUS at 14:30

## 2022-06-22 ENCOUNTER — TELEPHONE (OUTPATIENT)
Dept: GASTROENTEROLOGY | Facility: CLINIC | Age: 42
End: 2022-06-22

## 2022-06-22 DIAGNOSIS — K38.9 DISEASE OF APPENDIX: Primary | ICD-10-CM

## 2022-06-22 NOTE — TELEPHONE ENCOUNTER
Patient was notified. Patient is going to obtain a  referral from his PCP for general surgery, then we can schedule.

## 2022-06-22 NOTE — TELEPHONE ENCOUNTER
----- Message from TERESSA Rainey sent at 6/22/2022  8:43 AM EDT -----  Please let patient know that CT does show thickening of the wall of the appendix and does appear large.  Radiologist notes that this is concerning for early appendicitis.  I am placing referral for general surgeon for further consultation.

## 2022-06-27 ENCOUNTER — OFFICE VISIT (OUTPATIENT)
Dept: SURGERY | Facility: CLINIC | Age: 42
End: 2022-06-27

## 2022-06-27 ENCOUNTER — PREP FOR SURGERY (OUTPATIENT)
Dept: OTHER | Facility: HOSPITAL | Age: 42
End: 2022-06-27

## 2022-06-27 VITALS — RESPIRATION RATE: 14 BRPM | WEIGHT: 173.6 LBS | BODY MASS INDEX: 27.9 KG/M2 | HEIGHT: 66 IN

## 2022-06-27 DIAGNOSIS — R93.89 ABNORMAL CT SCAN: ICD-10-CM

## 2022-06-27 DIAGNOSIS — D12.1 BENIGN NEOPLASM OF APPENDIX: Primary | ICD-10-CM

## 2022-06-27 PROCEDURE — 99204 OFFICE O/P NEW MOD 45 MIN: CPT | Performed by: SURGERY

## 2022-06-27 RX ORDER — CEFAZOLIN SODIUM 2 G/100ML
2 INJECTION, SOLUTION INTRAVENOUS ONCE
Status: CANCELLED | OUTPATIENT
Start: 2022-06-27 | End: 2022-06-27

## 2022-06-27 NOTE — PROGRESS NOTES
"Chief Complaint:  Appendix    Primary Care Provider: Marguerite Alanis APRN    Referring Provider: Hallie Pressley APRN    History of Present Illness  Aidan Curiel is a 41 y.o. male referred by TERESSA Rainey consideration for appendectomy.  Colonoscopy was done by Dr. Joyner on 6/10/22 and showed \"One 12 mm submucosal nodule was found at the appendiceal orifice.\"  CT A/P was then done on 6/21/22.  The CT scan was done because of the aforementioned colonoscopy finding.  Following is a copy and paste of the relevant part from the radiology report:  \"The appendix is mildly thick walled and enlarged, concerning for early appendicitis.  Additionally, there is a endophytic well-circumscribed rounded lesion at the origin of the appendix it, protruding into the cecum, concerning for a mucocele, or appendiceal mass.  Recommend surgical consultation.  There are prominent lymph nodes in the mesentery adjacent to the appendix.  These could be reactive, but metastatic adenopathy is not excluded.\"  The patient has occasional pain in his right lower quadrant area but nothing really problematic.  No unintentional weight loss.    Allergies: Patient has no known allergies.    Outpatient Medications Marked as Taking for the 6/27/22 encounter (Office Visit) with Charlie Morales MD   Medication Sig Dispense Refill   • acetaminophen (TYLENOL) 500 MG tablet Take 1,000 mg by mouth 3 (Three) Times a Day As Needed for Mild Pain .     • DULoxetine (CYMBALTA) 60 MG capsule Take 60 mg by mouth Daily.     • NON FORMULARY Nifedipine 0.2% with lidocaine 2% topical four times daily x 8 weeks, dispense 30 grams, no refills. 30 g 0   • zolpidem (AMBIEN) 10 MG tablet Take 10 mg by mouth At Night As Needed.         Past Medical History:   • Colon polyp    2 were taken out in the last 2 weeks and came back normal   • GERD (gastroesophageal reflux disease)    Comes and goes   • GI (gastrointestinal bleed)    Blood in stool that will be for a " "day or two then goes away and then will come back for a day or two   • Hypertension    Not all the time   • Injury of back   • Low back pain   • PONV (postoperative nausea and vomiting)   • Sleep apnea        Past Surgical History:   • ANKLE SURGERY   • COLONOSCOPY    Procedure: COLONOSCOPY WITH POLYPECTOMY COLD SNARE;  Surgeon: Deepti Joyner MD;  [facility] END[facility]COPY;  Service: Gastroenterology;  Laterality: N/A;  COLON POLYPS/ANAL FISSURE   • EYE SURGERY    LASIK   • LASIK   • SHOULDER ARTHROSCOPY W/ SUPERIOR LABRAL ANTERIOR POSTERIOR LESION REPAIR   • TONSILLECTOMY       Family History:   Family History   Problem Relation Age of Onset   • Hemochromatosis Father    • Heart disease Father    • Hypertension Father    • Cancer Mother         Lung Cancer   • Heart disease Maternal Grandfather    • Hypertension Maternal Grandfather    • Malig Hyperthermia Neg Hx         Social History:  Social History     Tobacco Use   • Smoking status: Never Smoker   • Smokeless tobacco: Never Used   Substance Use Topics   • Alcohol use: Yes     Alcohol/week: 2.0 standard drinks     Types: 2 Cans of beer per week     Comment: Not every week maybe once a month will have a couple beers       Objective     Vital Signs:  Resp 14   Ht 167.6 cm (66\")   Wt 78.7 kg (173 lb 9.6 oz)   BMI 28.02 kg/m²   • Constitutional: healthy appearing, alert, no acute distress, reliable historian  • HENT:  NCAT, no visible deformities or lesions  • Eyes:  sclerae clear, conjunctivae clear, EOMI  • Neck:  normal appearance, no masses, trachea midline  • Respiratory:  breathing not labored, respiratory effort appears normal  • Cardiovascular:  heart regular rate  • Abdomen:  soft, nontender, nondistended    • Skin and subcutaneous tissue:  no visible concerning rashes or lesions, no jaundice  • Musculoskeletal: moving all extremities symmetrically and purposefully  • Neurologic:  no obvious motor or sensory deficits, normal gait, able to " stand without difficulty, cerebellar function without any obvious abnormalities, alert & oriented x 3, speech clear  • Psychiatric:  judgment and insight intact, mood normal, affect appropriate, cooperative      Assessment:  1. Benign neoplasm of appendix  2. Abnormal CT scan    Plan:  Robotic appendectomy    Discussion: Indications, options, risks, benefits, and expected outcomes of planned surgery were discussed with the patient and he agrees to proceed.    Charlie Morales MD  06/27/2022    Electronically signed by Charlie Morales MD, 06/27/22, 8:26 AM EDT.

## 2022-07-01 ENCOUNTER — LAB (OUTPATIENT)
Dept: LAB | Facility: HOSPITAL | Age: 42
End: 2022-07-01

## 2022-07-01 DIAGNOSIS — R93.89 ABNORMAL CT SCAN: ICD-10-CM

## 2022-07-01 DIAGNOSIS — D12.1 BENIGN NEOPLASM OF APPENDIX: ICD-10-CM

## 2022-07-01 LAB — SARS-COV-2 RNA PNL SPEC NAA+PROBE: NOT DETECTED

## 2022-07-01 PROCEDURE — U0004 COV-19 TEST NON-CDC HGH THRU: HCPCS

## 2022-07-07 ENCOUNTER — ANESTHESIA EVENT (OUTPATIENT)
Dept: PERIOP | Facility: HOSPITAL | Age: 42
End: 2022-07-07

## 2022-07-07 ENCOUNTER — HOSPITAL ENCOUNTER (OUTPATIENT)
Facility: HOSPITAL | Age: 42
Discharge: HOME | End: 2022-07-07
Attending: SURGERY | Admitting: SURGERY

## 2022-07-07 ENCOUNTER — ANESTHESIA (OUTPATIENT)
Dept: PERIOP | Facility: HOSPITAL | Age: 42
End: 2022-07-07

## 2022-07-07 VITALS
DIASTOLIC BLOOD PRESSURE: 93 MMHG | TEMPERATURE: 97 F | OXYGEN SATURATION: 93 % | HEIGHT: 66 IN | BODY MASS INDEX: 27.53 KG/M2 | HEART RATE: 85 BPM | RESPIRATION RATE: 16 BRPM | SYSTOLIC BLOOD PRESSURE: 148 MMHG | WEIGHT: 171.3 LBS

## 2022-07-07 DIAGNOSIS — R93.89 ABNORMAL CT SCAN: ICD-10-CM

## 2022-07-07 DIAGNOSIS — D12.1 BENIGN NEOPLASM OF APPENDIX: ICD-10-CM

## 2022-07-07 PROBLEM — K62.5 RECTAL BLEEDING: Status: RESOLVED | Noted: 2022-06-08 | Resolved: 2022-07-07

## 2022-07-07 PROCEDURE — 44970 LAPAROSCOPY APPENDECTOMY: CPT | Performed by: SPECIALIST/TECHNOLOGIST, OTHER

## 2022-07-07 PROCEDURE — 25010000002 CEFAZOLIN IN DEXTROSE 2-4 GM/100ML-% SOLUTION: Performed by: SURGERY

## 2022-07-07 PROCEDURE — 25010000002 KETOROLAC TROMETHAMINE PER 15 MG: Performed by: NURSE ANESTHETIST, CERTIFIED REGISTERED

## 2022-07-07 PROCEDURE — 25010000002 FENTANYL CITRATE (PF) 50 MCG/ML SOLUTION: Performed by: NURSE ANESTHETIST, CERTIFIED REGISTERED

## 2022-07-07 PROCEDURE — S2900 ROBOTIC SURGICAL SYSTEM: HCPCS | Performed by: SURGERY

## 2022-07-07 PROCEDURE — 25010000002 MIDAZOLAM PER 1 MG: Performed by: ANESTHESIOLOGY

## 2022-07-07 PROCEDURE — 88304 TISSUE EXAM BY PATHOLOGIST: CPT | Performed by: SURGERY

## 2022-07-07 PROCEDURE — 44970 LAPAROSCOPY APPENDECTOMY: CPT | Performed by: SURGERY

## 2022-07-07 PROCEDURE — 25010000002 ONDANSETRON PER 1 MG: Performed by: NURSE ANESTHETIST, CERTIFIED REGISTERED

## 2022-07-07 PROCEDURE — 25010000002 DEXAMETHASONE PER 1 MG: Performed by: NURSE ANESTHETIST, CERTIFIED REGISTERED

## 2022-07-07 PROCEDURE — 25010000002 PROPOFOL 10 MG/ML EMULSION: Performed by: NURSE ANESTHETIST, CERTIFIED REGISTERED

## 2022-07-07 DEVICE — STAPLER 60 RELOAD BLUE
Type: IMPLANTABLE DEVICE | Site: ABDOMEN | Status: FUNCTIONAL
Brand: SUREFORM

## 2022-07-07 RX ORDER — MAGNESIUM HYDROXIDE 1200 MG/15ML
LIQUID ORAL AS NEEDED
Status: DISCONTINUED | OUTPATIENT
Start: 2022-07-07 | End: 2022-07-07 | Stop reason: HOSPADM

## 2022-07-07 RX ORDER — ROCURONIUM BROMIDE 10 MG/ML
INJECTION, SOLUTION INTRAVENOUS AS NEEDED
Status: DISCONTINUED | OUTPATIENT
Start: 2022-07-07 | End: 2022-07-07 | Stop reason: SURG

## 2022-07-07 RX ORDER — BUPIVACAINE HYDROCHLORIDE 2.5 MG/ML
INJECTION, SOLUTION EPIDURAL; INFILTRATION; INTRACAUDAL AS NEEDED
Status: DISCONTINUED | OUTPATIENT
Start: 2022-07-07 | End: 2022-07-07 | Stop reason: HOSPADM

## 2022-07-07 RX ORDER — PROPOFOL 10 MG/ML
VIAL (ML) INTRAVENOUS AS NEEDED
Status: DISCONTINUED | OUTPATIENT
Start: 2022-07-07 | End: 2022-07-07 | Stop reason: SURG

## 2022-07-07 RX ORDER — DEXAMETHASONE SODIUM PHOSPHATE 4 MG/ML
INJECTION, SOLUTION INTRA-ARTICULAR; INTRALESIONAL; INTRAMUSCULAR; INTRAVENOUS; SOFT TISSUE AS NEEDED
Status: DISCONTINUED | OUTPATIENT
Start: 2022-07-07 | End: 2022-07-07 | Stop reason: SURG

## 2022-07-07 RX ORDER — LIDOCAINE HYDROCHLORIDE 20 MG/ML
INJECTION, SOLUTION EPIDURAL; INFILTRATION; INTRACAUDAL; PERINEURAL AS NEEDED
Status: DISCONTINUED | OUTPATIENT
Start: 2022-07-07 | End: 2022-07-07 | Stop reason: SURG

## 2022-07-07 RX ORDER — ONDANSETRON 2 MG/ML
4 INJECTION INTRAMUSCULAR; INTRAVENOUS ONCE AS NEEDED
Status: DISCONTINUED | OUTPATIENT
Start: 2022-07-07 | End: 2022-07-07 | Stop reason: HOSPADM

## 2022-07-07 RX ORDER — PROMETHAZINE HYDROCHLORIDE 12.5 MG/1
25 TABLET ORAL ONCE AS NEEDED
Status: DISCONTINUED | OUTPATIENT
Start: 2022-07-07 | End: 2022-07-07 | Stop reason: HOSPADM

## 2022-07-07 RX ORDER — PROMETHAZINE HYDROCHLORIDE 25 MG/1
25 SUPPOSITORY RECTAL ONCE AS NEEDED
Status: DISCONTINUED | OUTPATIENT
Start: 2022-07-07 | End: 2022-07-07 | Stop reason: HOSPADM

## 2022-07-07 RX ORDER — SODIUM CHLORIDE, SODIUM LACTATE, POTASSIUM CHLORIDE, CALCIUM CHLORIDE 600; 310; 30; 20 MG/100ML; MG/100ML; MG/100ML; MG/100ML
9 INJECTION, SOLUTION INTRAVENOUS CONTINUOUS PRN
Status: DISCONTINUED | OUTPATIENT
Start: 2022-07-07 | End: 2022-07-07 | Stop reason: HOSPADM

## 2022-07-07 RX ORDER — ONDANSETRON 2 MG/ML
INJECTION INTRAMUSCULAR; INTRAVENOUS AS NEEDED
Status: DISCONTINUED | OUTPATIENT
Start: 2022-07-07 | End: 2022-07-07 | Stop reason: SURG

## 2022-07-07 RX ORDER — KETOROLAC TROMETHAMINE 30 MG/ML
INJECTION, SOLUTION INTRAMUSCULAR; INTRAVENOUS AS NEEDED
Status: DISCONTINUED | OUTPATIENT
Start: 2022-07-07 | End: 2022-07-07 | Stop reason: SURG

## 2022-07-07 RX ORDER — CEFAZOLIN SODIUM 2 G/100ML
2 INJECTION, SOLUTION INTRAVENOUS ONCE
Status: COMPLETED | OUTPATIENT
Start: 2022-07-07 | End: 2022-07-07

## 2022-07-07 RX ORDER — HYDROCODONE BITARTRATE AND ACETAMINOPHEN 5; 325 MG/1; MG/1
1 TABLET ORAL EVERY 4 HOURS PRN
Qty: 10 TABLET | Refills: 0 | Status: SHIPPED | OUTPATIENT
Start: 2022-07-07 | End: 2022-07-13

## 2022-07-07 RX ORDER — GLYCOPYRROLATE 0.2 MG/ML
0.2 INJECTION INTRAMUSCULAR; INTRAVENOUS
Status: COMPLETED | OUTPATIENT
Start: 2022-07-07 | End: 2022-07-07

## 2022-07-07 RX ORDER — ACETAMINOPHEN 500 MG
1000 TABLET ORAL ONCE
Status: COMPLETED | OUTPATIENT
Start: 2022-07-07 | End: 2022-07-07

## 2022-07-07 RX ORDER — OXYCODONE HYDROCHLORIDE 5 MG/1
5 TABLET ORAL
Status: DISCONTINUED | OUTPATIENT
Start: 2022-07-07 | End: 2022-07-07 | Stop reason: HOSPADM

## 2022-07-07 RX ORDER — FENTANYL CITRATE 50 UG/ML
INJECTION, SOLUTION INTRAMUSCULAR; INTRAVENOUS AS NEEDED
Status: DISCONTINUED | OUTPATIENT
Start: 2022-07-07 | End: 2022-07-07 | Stop reason: SURG

## 2022-07-07 RX ORDER — MIDAZOLAM HYDROCHLORIDE 1 MG/ML
2 INJECTION INTRAMUSCULAR; INTRAVENOUS ONCE
Status: COMPLETED | OUTPATIENT
Start: 2022-07-07 | End: 2022-07-07

## 2022-07-07 RX ORDER — MEPERIDINE HYDROCHLORIDE 25 MG/ML
12.5 INJECTION INTRAMUSCULAR; INTRAVENOUS; SUBCUTANEOUS
Status: DISCONTINUED | OUTPATIENT
Start: 2022-07-07 | End: 2022-07-07 | Stop reason: HOSPADM

## 2022-07-07 RX ADMIN — MIDAZOLAM HYDROCHLORIDE 2 MG: 1 INJECTION, SOLUTION INTRAMUSCULAR; INTRAVENOUS at 12:45

## 2022-07-07 RX ADMIN — CEFAZOLIN SODIUM 2 G: 2 INJECTION, SOLUTION INTRAVENOUS at 13:27

## 2022-07-07 RX ADMIN — DEXAMETHASONE SODIUM PHOSPHATE 4 MG: 4 INJECTION, SOLUTION INTRA-ARTICULAR; INTRALESIONAL; INTRAMUSCULAR; INTRAVENOUS; SOFT TISSUE at 13:26

## 2022-07-07 RX ADMIN — PROPOFOL 200 MG: 10 INJECTION, EMULSION INTRAVENOUS at 13:26

## 2022-07-07 RX ADMIN — ROCURONIUM BROMIDE 50 MG: 10 INJECTION INTRAVENOUS at 13:26

## 2022-07-07 RX ADMIN — ACETAMINOPHEN 1000 MG: 500 TABLET ORAL at 10:47

## 2022-07-07 RX ADMIN — SODIUM CHLORIDE, POTASSIUM CHLORIDE, SODIUM LACTATE AND CALCIUM CHLORIDE 9 ML/HR: 600; 310; 30; 20 INJECTION, SOLUTION INTRAVENOUS at 10:46

## 2022-07-07 RX ADMIN — GLYCOPYRROLATE 0.2 MG: 0.2 INJECTION INTRAMUSCULAR; INTRAVENOUS at 12:45

## 2022-07-07 RX ADMIN — FENTANYL CITRATE 100 MCG: 50 INJECTION, SOLUTION INTRAMUSCULAR; INTRAVENOUS at 13:26

## 2022-07-07 RX ADMIN — ROCURONIUM BROMIDE 25 MG: 10 INJECTION INTRAVENOUS at 13:53

## 2022-07-07 RX ADMIN — LIDOCAINE HYDROCHLORIDE 80 MG: 20 INJECTION, SOLUTION EPIDURAL; INFILTRATION; INTRACAUDAL; PERINEURAL at 13:26

## 2022-07-07 RX ADMIN — KETOROLAC TROMETHAMINE 30 MG: 30 INJECTION, SOLUTION INTRAMUSCULAR; INTRAVENOUS at 13:26

## 2022-07-07 RX ADMIN — SUGAMMADEX 200 MG: 100 INJECTION, SOLUTION INTRAVENOUS at 14:23

## 2022-07-07 RX ADMIN — ONDANSETRON 4 MG: 2 INJECTION INTRAMUSCULAR; INTRAVENOUS at 13:26

## 2022-07-07 RX ADMIN — FENTANYL CITRATE 100 MCG: 50 INJECTION, SOLUTION INTRAMUSCULAR; INTRAVENOUS at 13:51

## 2022-07-07 NOTE — DISCHARGE INSTRUCTIONS
DISCHARGE INSTRUCTIONS LAPAROSCOPIC APPENDECTOMY        For your surgery you had:  General anesthesia (you may have a sore throat for the first 24 hours)  You may experience dizziness, drowsiness, or light-headedness for several hours following surgery.  Do not stay alone tonight.  Limit your activity for 24 hours.  Resume your diet slowly.  Follow whatever special dietary instructions you may have been given by your doctor.  You should not drive, operate machinery, drink alcohol, or sign legally binding documents for 24 hours or while you are taking pain medication.      NOTIFY YOUR DOCTOR IF YOU EXPERIENCE ANY OF THE FOLLOWING:  Temperature greater than 101 degrees Fahrenheit  Shaking Chills  Redness or excessive drainage from incision  Nausea, vomiting and/or pain that is not controlled by prescribed medications  Increase in bleeding or bleeding that is excessive  Unable to urinate in 6 hours after surgery  If unable to reach your doctor, please go to the closest Emergency Room You may remove Band-Aid/dressing  48 hours  You may shower in 48 hours.  Apply an ice pack 24-48 hours.  You may experience gas discomfort 24-48 hours after discharge, especially in chest and shoulders.  Changing position frequently may alleviate this discomfort.  If you have excessive pain, swelling, redness, drainage or other problems, notify your physician.  If unable to urinate in 6 to 8 hours after surgery or urinating frequently in small amounts, notify your doctor or go to the nearest Emergency Room.  Medications per physician instructions as indicated on Discharge Medication Information Sheet.      SPECIAL INSTRUCTIONS:  See Dr. Morales's instructions below      Last dose of pain medication was given at:  Tylenol (1000mg) last at 10:45am. Do not exceed 4000mg of tylenol in a 24 hour period.  Toradol last at 1:30pm, may take ibuprofen if able at 7:30pm if needed.  May take norco next at any time if needed.     Dr. Morales's  Instructions          DIET  Gradually increase your dietary intake.  Although you will likely feel very hungry after surgery, do not eat too much for the first 12 to 24 hours.  Begin with a bland diet, such as chicken noodle soup, crackers, gatorade or tea, and gradually work your way up to a normal diet.     ACTIVITY & RETURN TO WORK  When you first get home from the hospital, it is important that you get up and move around your house.  For the next four weeks, you should avoid any strenuous physical activity and you should not lift anything heavier than 25 pounds.  Walking up stairs and walking short distances for exercise are acceptable activities.  After four weeks, you have no activity restrictions and may gradually increase your activities using common sense.  You are excused from work for four weeks but you may return to work anytime before then should you feel able to do so and you abide by the lifting restriction.     WOUND CARE & SHOWERING/BATHING  Remove the bandages two days after your surgery but leave the strips of tape (steri-strips) underneath the bandages in place.  Let the steri-strips fall off by themselves or gently pull them off if they haven't fallen off by themselves in 10 days.  You have sutures in your incisions but they are placed below the level of the skin and they will slowly dissolve (they do not need to be removed).  The skin around your incisions will likely have some bruising.  This is normal.  You can shower beginning two days after the surgery but try not to get the steri-strips very wet for the first week after surgery.  Wait two weeks after your surgery before taking any tub baths.  Also, do not get in hot tubs, swimming pools, or lake water for two weeks.     PAIN CONTROL  You will receive a narcotic pain medicine prescription before you are discharged home.  Be sure to take the narcotic pain medication with some food so as not to upset your stomach.  Do not drive while you are  taking the prescription pain medication.  Also, take 3 tablets of Motrin 200 mg (total of 600mg) every 8 hours for the next 3 days & then discontinue.  Advil and ibuprofen work the same as Motrin so you can use the same dose of either one of them instead of Motrin.  Some patients find that using an ice pack (a package of frozen corn or peas works well) for the first two days after surgery helps reduce pain.  If you decide to use an ice pack, apply it for 20 minutes and then remove it for 20 minutes.  Do this 4 or 5 times each day for only the first two or three days after surgery.  You will likely have some shoulder pain (especially the right shoulder).  This is caused by the air used to inflate your abdomen during surgery and will go away on its own in 24 to 48 hours.     BOWEL MOVEMENTS  It is not unusual for narcotic pain medications to cause constipation.  Also, the medications and anesthesia you received for your surgery can have a constipating effect.  To help avoid constipation, drink at least four eight-ounce glasses of water each day and use over-the-counter laxatives/stool softeners (dulcolax, milk of magnesia, senokot, etc.).  I recommend drinking the aforementioned amount of water daily and taking 30 ml of milk of magnesia two times each day while you are using the prescribed narcotic pain medication.  If your bowel movements become too loose or too frequent, then simply stop following these recommendations unless you start to feel constipated.     FOLLOW-UP VISIT & QUESTIONS/CONCERNS  Call Dr. Morales's office at [phone] and schedule a follow-up appointment for about 3 weeks after your surgery date.  Should you have any questions or concerns, have a temperature over 101 degrees, worsening abdominal pain, persistent nausea or vomiting, or any other problems you think need medical attention, please call Dr. Morales's office or go to the emergency room.

## 2022-07-07 NOTE — ANESTHESIA PREPROCEDURE EVALUATION
Anesthesia Evaluation     Patient summary reviewed and Nursing notes reviewed   no history of anesthetic complications:  NPO Solid Status: > 8 hours  NPO Liquid Status: > 2 hours           Airway   Mallampati: I  TM distance: >3 FB  Neck ROM: full  No difficulty expected  Dental      Pulmonary - normal exam    breath sounds clear to auscultation  (+) sleep apnea,   Cardiovascular - negative cardio ROS and normal exam  Exercise tolerance: good (4-7 METS)    Rhythm: regular  Rate: normal        Neuro/Psych- negative ROS  GI/Hepatic/Renal/Endo    (+)  GERD,      ROS Comment: Mass appendix     Musculoskeletal     Abdominal    Substance History      OB/GYN          Other                        Anesthesia Plan    ASA 1     general       Anesthetic plan, risks, benefits, and alternatives have been provided, discussed and informed consent has been obtained with: patient.        CODE STATUS:

## 2022-07-07 NOTE — OP NOTE
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 Item Serial No.  Lot No. LRB No. Used Action   RELOAD STPLR SUREFORM 60 DAVINCI/X/XI 6ROW 3.5 SHARIF 1P/U - [id number] Implant RELOAD STPLR SUREFORM 60 DAVINCI/X/XI 6ROW 3.5 SHARIF 1P/U  INTUITIVE [employer] [id number] N/A 1 Implanted       Specimen:          Specimens     ID Source Type Tests Collected By Collected At Frozen?    A Large Intestine, Appendix Tissue · TISSUE PATHOLOGY EXAM   Charlie Morales MD 7/7/22 2065     Description: APPENDIX    This specimen was not marked as sent.           Indications:  Aidan Curiel is a 41 y.o. male referred for appendectomy.  Colonoscopy was done by Dr. Joyner on 6/10/22 and showed a \"12 mm submucosal nodule was found at the appendiceal orifice.\"  CT A/P was then done and showed the \"appendix is mildly thick walled and enlarged and a endophytic well-circumscribed rounded lesion at the origin of the appendix  protruding into the cecum, concerning for a mucocele, or appendiceal mass.\"   See my preoperative H&P for further details.     Findings:  Grossly, no evidence of appendiceal malignancy.  Cecum normal appearing.  Appendix along with its mesentery was removed.  A small section of the cecum was removed along with the appendix to ensure the abnormality seen at the appendiceal orifice " would be included with the specimen.    Description of Procedure: Patient was taken to the operating room and placed supine on the operative table.  Timeout was performed.  General anesthesia was administered.  The patient was prepped and draped in the usual fashion.  Using my standard technique with a Veress needle, pneumoperitoneum was established and then two 8 mm robotic cannulas and one 12 mm robotic cannula was placed in a line at the right lateral abdomen.  The patient will positioned head down and rotated to the left side.  The robotic arms were docked.  The robotic instruments were inserted.  Attention was focused in the right lower quadrant.  The appendix was easily identified.  Grossly, there was no evidence of appendiceal cancer and the cecum was normal-appearing.  The mesentery of the appendix was divided with the vessel sealer and then a blue load of the robotic stapler was used to staple across and divide part of the cecum at the base of the appendix taking care to ensure that the staple line did not involve the ileocecal valve and that enough of the cecum would be removed to ensure that any abnormality at the appendiceal orifice would be included with the specimen.  The appendix was placed in an Endo Catch bag and removed from the abdomen and sent to pathology.  Staple line was examined.  It was intact.  There was adequate hemostasis.  There was minimal blood loss during the procedure.  The 12 mm cannula fascial site was closed with an 0 Vicryl suture using the suture passer.  Sponge needle and instrument counts were verified as correct.  The robotic arms were undocked and the robotic cannulas were removed.  The skin incisions were closed appropriately with buried absorbable suture followed by appropriate dressings.  Patient did well during the procedure and was transported to the recovery area in stable condition.    Assistant: Mickey Crowley CSA  was responsible for performing the following  activities: closing, placing dressing and assisting with docking robot and exchanging robotic instruments and adjusting robotic arms as needed during the procedure, and his skilled assistance was necessary for the success of this case.    Charlie Morales MD     Date: 7/7/2022  Time: 14:30 EDT

## 2022-07-07 NOTE — ANESTHESIA POSTPROCEDURE EVALUATION
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        Post Anesthesia Care and Evaluation    Patient location during evaluation: bedside  Patient participation: complete - patient participated  Level of consciousness: awake, responsive to verbal stimuli, responsive to light touch, responsive to noxious stimuli, responsive to painful stimuli and responsive to physical stimuli  Pain score: 2  Pain management: adequate    Airway patency: patent  Anesthetic complications: No anesthetic complications  PONV Status: none  Cardiovascular status: acceptable and stable  Respiratory status: acceptable and nasal cannula  Hydration status: acceptable    Comments: An Anesthesiologist personally participated in the most demanding procedures (including induction and emergence if applicable) in the anesthesia plan, monitored the course of anesthesia administration at frequent intervals and remained physically present and available for immediate diagnosis and treatment of emergencies.

## 2022-07-13 ENCOUNTER — OFFICE VISIT (OUTPATIENT)
Dept: GASTROENTEROLOGY | Facility: CLINIC | Age: 42
End: 2022-07-13

## 2022-07-13 VITALS
HEART RATE: 72 BPM | DIASTOLIC BLOOD PRESSURE: 99 MMHG | HEIGHT: 66 IN | WEIGHT: 175.4 LBS | SYSTOLIC BLOOD PRESSURE: 166 MMHG | BODY MASS INDEX: 28.19 KG/M2

## 2022-07-13 DIAGNOSIS — K60.2 ANAL FISSURE: Primary | ICD-10-CM

## 2022-07-13 DIAGNOSIS — K63.5 HYPERPLASTIC POLYP OF ASCENDING COLON: ICD-10-CM

## 2022-07-13 PROCEDURE — 99212 OFFICE O/P EST SF 10 MIN: CPT | Performed by: NURSE PRACTITIONER

## 2022-07-13 NOTE — PROGRESS NOTES
Chief Complaint  Follow-up (Colonoscopy and CT/Post Op Appendectomy )    History of Present Illness  Aidan Curiel is a 41 y.o. who presents to [facility] for follow up of Follow-up (Colonoscopy and CT/Post Op Appendectomy )     Mr. Curiel presents today for f/u colonoscopy due to rectal bleeding.  Anal fissure was noted.  Patient has treated fissure with nifedipine cream and has noticed a significant decrease in rectal bleeding.   During colonoscopy it was noted that there was a submucosal nodule around appendiceal orifice.  CT scan then showed that appendix was thickened and enlarged concerning for appendicitis.  Patient shortly after had appendectomy.     Having a bowel movement at least once daily, formed stool.  Did notice that stool was a little bit harder after appendectomy but continues to soften.  Also reports mild bloating but does notice he gets better each day with healing process.  Finds relief from bloating as well after using the restroom.  Denies any melena or abdominal pain.      Labs Result Review Imaging    Past Medical History:   Diagnosis Date   • Blood in the stool     HX OF REPORTS WILL HAVE FOR DAY OR TWO AND THEN GOES AWAY   • Colon polyp     2 were taken out in the last 2 weeks and came back normal   • GERD (gastroesophageal reflux disease)     Comes and goes   • Injury of back    • Low back pain    • Lumbosacral spondylosis without myelopathy    • PONV (postoperative nausea and vomiting)    • Sleep apnea        Past Surgical History:   Procedure Laterality Date   • ANKLE SURGERY Left    • APPENDECTOMY     • COLON RESECTION N/A 07/07/2022    Procedure: Robotic appendectomy;  Surgeon: Charlie Morales MD;  Location: [facility] MAIN OR;  Service: Robotics - DaVinci;  Laterality: N/A;   • COLONOSCOPY N/A 06/10/2022    Procedure: COLONOSCOPY WITH POLYPECTOMY COLD SNARE;  Surgeon: Deepti Joyner MD;  Location: [facility] ENDOSCOPY;  Service:  "Gastroenterology;  Laterality: N/A;  COLON POLYPS/ANAL FISSURE   • LASIK     • SHOULDER ARTHROSCOPY W/ SUPERIOR LABRAL ANTERIOR POSTERIOR LESION REPAIR Left    • TONSILLECTOMY         Current Outpatient Medications on File Prior to Visit   Medication Sig Dispense Refill   • acetaminophen (TYLENOL) 500 MG tablet Take 1,000 mg by mouth 3 (Three) Times a Day As Needed for Mild Pain .     • DULoxetine (CYMBALTA) 60 MG capsule Take 60 mg by mouth Daily.     • NON FORMULARY Nifedipine 0.2% with lidocaine 2% topical four times daily x 8 weeks, dispense 30 grams, no refills. 30 g 0   • Proctofoam HC 1-1 % rectal foam Insert 1 application into the rectum 2 (Two) Times a Day As Needed.     • zolpidem (AMBIEN) 10 MG tablet Take 10 mg by mouth At Night As Needed.     • [DISCONTINUED] HYDROcodone-acetaminophen (Norco) 5-325 MG per tablet Take 1 tablet by mouth Every 4 (Four) Hours As Needed for Moderate Pain  (You can take two tablets instead of one tablet every four hours if needed for pain). 10 tablet 0     No current facility-administered medications on file prior to visit.       Social History     Social History Narrative   • Not on file         Objective     Review of Systems   Gastrointestinal: Positive for abdominal distention.        Vital Signs:   /99 (BP Location: Right arm, Patient Position: Sitting, Cuff Size: Small Adult)   Pulse 72   Ht 167.6 cm (66\")   Wt 79.6 kg (175 lb 6.4 oz)   BMI 28.31 kg/m²       Physical Exam  Constitutional:       General: He is not in acute distress.     Appearance: Normal appearance. He is well-developed and normal weight.   HENT:      Head: Normocephalic and atraumatic.   Eyes:      Conjunctiva/sclera: Conjunctivae normal.      Pupils: Pupils are equal, round, and reactive to light.      Visual Fields: Right eye visual fields normal and left eye visual fields normal.   Cardiovascular:      Rate and Rhythm: Normal rate and regular rhythm.      Heart sounds: Normal heart sounds. "   Pulmonary:      Effort: Pulmonary effort is normal. No retractions.      Breath sounds: Normal breath sounds and air entry.   Abdominal:      General: Bowel sounds are normal. There is no distension.      Palpations: Abdomen is soft.      Tenderness: There is no abdominal tenderness.      Comments: No appreciable hepatosplenomegaly or ascites   Musculoskeletal:         General: Normal range of motion.      Cervical back: Normal range of motion and neck supple.   Skin:     General: Skin is warm and dry.   Neurological:      Mental Status: He is alert and oriented to person, place, and time.   Psychiatric:         Mood and Affect: Mood and affect normal.         Behavior: Behavior normal.         Result Review :   The following data was reviewed by: TERESSA Rainey on 07/13/2022:      No results found for: LIPASE, AMYLASE, IRON, TIBC, LABIRON, TRANSFERRIN, FERRITIN, SEDRATE, CRP, AFPTM, DSDNA, EXPANDEDENA, SMOOTHMUSCAB, CERULOPLSM, LABIMMURE, TOTIGGREF, IGA, IGM, MITOAB, HEPBSAG, HEPAIGM, HEPBIGMCORE, HEPCVIRUSABY, PROTIME, INR, AMMONIA     Colonoscopy 6/10/22: Anal fissure and on perianal exam posterior midline.  2 sessile polyps in ascending colon.  1-12 mm submucosal nodule at appendiceal orifice.  Ascending colon polyp-fragments of hyperplastic polyp.    CT of the abdomen pelvis with contrast 6/21/2022: Appendix is mildly thick-walled and enlarged, concerning for early appendicitis.     Assessment and Plan    Diagnoses and all orders for this visit:    1. Anal fissure (Primary)    2. Hyperplastic polyp of ascending colon      * Surgery not found *       Follow Up   Return if symptoms worsen or fail to improve.  Patient was given instructions and counseling regarding his condition or for health maintenance advice. Please see specific information pulled into the AVS if appropriate.

## 2022-07-29 ENCOUNTER — OFFICE VISIT (OUTPATIENT)
Dept: SURGERY | Facility: CLINIC | Age: 42
End: 2022-07-29

## 2022-07-29 VITALS — HEIGHT: 66 IN | BODY MASS INDEX: 28.28 KG/M2 | WEIGHT: 176 LBS | RESPIRATION RATE: 16 BRPM

## 2022-07-29 DIAGNOSIS — Z09 ENCOUNTER FOR FOLLOW-UP: Primary | ICD-10-CM

## 2022-07-29 PROCEDURE — 99024 POSTOP FOLLOW-UP VISIT: CPT | Performed by: SURGERY

## 2022-07-29 NOTE — PROGRESS NOTES
Patient is here for follow-up after elective appendectomy along with removal of part of the cecum for a possible mass at the appendiceal orifice.  Patient is doing well.  Pathology did not show anything concerning.  Abdominal exam is benign.  Patient has no concerns.  No new issues to address.  Patient seems pleased with his progress and can see me PRN.

## (undated) DEVICE — TOTAL TRAY, 16FR 10ML SIL FOLEY, URN: Brand: MEDLINE

## (undated) DEVICE — SUT PDS 1 CT1 36IN Z347H

## (undated) DEVICE — CANNULA SEAL

## (undated) DEVICE — SLV SCD KN/LEN ADJ EXPRSS BLENDED MD 1P/U

## (undated) DEVICE — DRSNG WND GZ CURAD OIL EMULSION 3X3IN STRL

## (undated) DEVICE — 30977 SEE SHARP - ENHANCED INTRAOPERATIVE LAPAROSCOPE CLEANING & DEFOGGING: Brand: 30977 SEE SHARP - ENHANCED INTRAOPERATIVE LAPAROSCOPE CLEANING & DEFOGGING

## (undated) DEVICE — SOL IRR NACL 0.9PCT BT 1000ML

## (undated) DEVICE — INTENDED FOR TISSUE SEPARATION, AND OTHER PROCEDURES THAT REQUIRE A SHARP SURGICAL BLADE TO PUNCTURE OR CUT.: Brand: BARD-PARKER ® CARBON RIB-BACK BLADES

## (undated) DEVICE — SHEET,DRAPE,70X85,STERILE: Brand: MEDLINE

## (undated) DEVICE — COLON KIT: Brand: MEDLINE INDUSTRIES, INC.

## (undated) DEVICE — PENCL E/S SMOKEEVAC W/TELESCP CANN

## (undated) DEVICE — LIGHT SLEEVE: Brand: DEVON

## (undated) DEVICE — TIP COVER ACCESSORY

## (undated) DEVICE — ARM DRAPE

## (undated) DEVICE — GOWN,REINFORCE,POLY,SIRUS,BREATH SLV,XLG: Brand: MEDLINE

## (undated) DEVICE — THE SINGLE USE ETRAP – POLYP TRAP IS USED FOR SUCTION RETRIEVAL OF ENDOSCOPICALLY REMOVED POLYPS.: Brand: ETRAP

## (undated) DEVICE — DAVINCI-LF: Brand: MEDLINE INDUSTRIES, INC.

## (undated) DEVICE — Device: Brand: DEFENDO AIR/WATER/SUCTION AND BIOPSY VALVE

## (undated) DEVICE — ANTIBACTERIAL VIOLET BRAIDED (POLYGLACTIN 910), SYNTHETIC ABSORBABLE SUTURE: Brand: COATED VICRYL

## (undated) DEVICE — STAPLER 60: Brand: SUREFORM

## (undated) DEVICE — REDUCER: Brand: ENDOWRIST

## (undated) DEVICE — SNAR POLYP CAPTIFLEX XS/OVL 11X2.4MM 240CM 1P/U

## (undated) DEVICE — LAP PORT CLOSURE GUIDES 5MM AND 10/12MM: Brand: LAP PORT CLOSURE GUIDES 5MM AND 10/12MM

## (undated) DEVICE — ENDOPATH XCEL BLADELESS TROCARS WITH STABILITY SLEEVES: Brand: ENDOPATH XCEL

## (undated) DEVICE — VESSEL SEALER EXTEND: Brand: ENDOWRIST

## (undated) DEVICE — SEAL

## (undated) DEVICE — SUT VIC PLS CTD BR 0 TIE 18IN VIL

## (undated) DEVICE — SOL NACL 0.9PCT 1000ML

## (undated) DEVICE — GLV SURG BIOGEL LTX PF 7 1/2

## (undated) DEVICE — 3 RING SUTURE PASSER - 16 CM: Brand: 3 RING SUTURE PASSER - 16 CM

## (undated) DEVICE — ENDOPATH PNEUMONEEDLE INSUFFLATION NEEDLES WITH LUER LOCK CONNECTORS 120MM: Brand: ENDOPATH

## (undated) DEVICE — SOL IRRG H2O PL/BG 1000ML STRL